# Patient Record
Sex: FEMALE | Race: WHITE | NOT HISPANIC OR LATINO | URBAN - METROPOLITAN AREA
[De-identification: names, ages, dates, MRNs, and addresses within clinical notes are randomized per-mention and may not be internally consistent; named-entity substitution may affect disease eponyms.]

---

## 2024-12-03 ENCOUNTER — APPOINTMENT (OUTPATIENT)
Dept: RADIOLOGY | Facility: HOSPITAL | Age: 69
End: 2024-12-03
Payer: COMMERCIAL

## 2024-12-03 ENCOUNTER — HOSPITAL ENCOUNTER (INPATIENT)
Facility: HOSPITAL | Age: 69
LOS: 3 days | Discharge: HOME | End: 2024-12-06
Attending: STUDENT IN AN ORGANIZED HEALTH CARE EDUCATION/TRAINING PROGRAM | Admitting: INTERNAL MEDICINE
Payer: COMMERCIAL

## 2024-12-03 ENCOUNTER — APPOINTMENT (OUTPATIENT)
Dept: CARDIOLOGY | Facility: HOSPITAL | Age: 69
End: 2024-12-03
Payer: COMMERCIAL

## 2024-12-03 DIAGNOSIS — I50.41 ACUTE COMBINED SYSTOLIC AND DIASTOLIC HEART FAILURE: ICD-10-CM

## 2024-12-03 DIAGNOSIS — I50.23 ACUTE ON CHRONIC SYSTOLIC (CONGESTIVE) HEART FAILURE: ICD-10-CM

## 2024-12-03 DIAGNOSIS — A41.9 SEPSIS (MULTI): Primary | ICD-10-CM

## 2024-12-03 DIAGNOSIS — J81.0 FLASH PULMONARY EDEMA: ICD-10-CM

## 2024-12-03 DIAGNOSIS — J44.1 COPD EXACERBATION (MULTI): ICD-10-CM

## 2024-12-03 DIAGNOSIS — I42.9 CARDIOMYOPATHY, UNSPECIFIED: ICD-10-CM

## 2024-12-03 DIAGNOSIS — H61.22 IMPACTED CERUMEN OF LEFT EAR: ICD-10-CM

## 2024-12-03 DIAGNOSIS — J18.9 PNEUMONIA OF BOTH LUNGS DUE TO INFECTIOUS ORGANISM, UNSPECIFIED PART OF LUNG: ICD-10-CM

## 2024-12-03 DIAGNOSIS — J96.02 ACUTE RESPIRATORY FAILURE WITH HYPERCAPNIA (MULTI): ICD-10-CM

## 2024-12-03 LAB
ALBUMIN SERPL BCP-MCNC: 3.7 G/DL (ref 3.4–5)
ALP SERPL-CCNC: 73 U/L (ref 33–136)
ALT SERPL W P-5'-P-CCNC: 13 U/L (ref 7–45)
ANION GAP BLDV CALCULATED.4IONS-SCNC: 16 MMOL/L (ref 10–25)
ANION GAP BLDV CALCULATED.4IONS-SCNC: 9 MMOL/L (ref 10–25)
ANION GAP SERPL CALC-SCNC: 17 MMOL/L (ref 10–20)
APTT PPP: 31 SECONDS (ref 27–38)
AST SERPL W P-5'-P-CCNC: 20 U/L (ref 9–39)
BASE EXCESS BLDV CALC-SCNC: -2.1 MMOL/L (ref -2–3)
BASE EXCESS BLDV CALC-SCNC: -9.7 MMOL/L (ref -2–3)
BASOPHILS # BLD AUTO: 0.02 X10*3/UL (ref 0–0.1)
BASOPHILS NFR BLD AUTO: 0.2 %
BILIRUB DIRECT SERPL-MCNC: 0 MG/DL (ref 0–0.3)
BILIRUB SERPL-MCNC: 0.3 MG/DL (ref 0–1.2)
BNP SERPL-MCNC: 2654 PG/ML (ref 0–99)
BODY TEMPERATURE: 37 DEGREES CELSIUS
BODY TEMPERATURE: 37 DEGREES CELSIUS
BUN SERPL-MCNC: 15 MG/DL (ref 6–23)
CA-I BLDV-SCNC: 1.1 MMOL/L (ref 1.1–1.33)
CA-I BLDV-SCNC: 1.14 MMOL/L (ref 1.1–1.33)
CALCIUM SERPL-MCNC: 8.4 MG/DL (ref 8.6–10.3)
CARDIAC TROPONIN I PNL SERPL HS: 164 NG/L (ref 0–13)
CARDIAC TROPONIN I PNL SERPL HS: 224 NG/L (ref 0–13)
CARDIAC TROPONIN I PNL SERPL HS: 251 NG/L (ref 0–13)
CHLORIDE BLDV-SCNC: 101 MMOL/L (ref 98–107)
CHLORIDE BLDV-SCNC: 98 MMOL/L (ref 98–107)
CHLORIDE SERPL-SCNC: 98 MMOL/L (ref 98–107)
CHOLEST SERPL-MCNC: 117 MG/DL (ref 0–199)
CHOLESTEROL/HDL RATIO: 2.6
CO2 SERPL-SCNC: 21 MMOL/L (ref 21–32)
CREAT SERPL-MCNC: 0.91 MG/DL (ref 0.5–1.05)
D DIMER PPP FEU-MCNC: 4570 NG/ML FEU
EGFRCR SERPLBLD CKD-EPI 2021: 69 ML/MIN/1.73M*2
EJECTION FRACTION APICAL 4 CHAMBER: 44.8
EJECTION FRACTION: 40 %
EOSINOPHIL # BLD AUTO: 0 X10*3/UL (ref 0–0.7)
EOSINOPHIL NFR BLD AUTO: 0 %
ERYTHROCYTE [DISTWIDTH] IN BLOOD BY AUTOMATED COUNT: 15.8 % (ref 11.5–14.5)
GLUCOSE BLD MANUAL STRIP-MCNC: 119 MG/DL (ref 74–99)
GLUCOSE BLD MANUAL STRIP-MCNC: 133 MG/DL (ref 74–99)
GLUCOSE BLD MANUAL STRIP-MCNC: 156 MG/DL (ref 74–99)
GLUCOSE BLDV-MCNC: 214 MG/DL (ref 74–99)
GLUCOSE BLDV-MCNC: 318 MG/DL (ref 74–99)
GLUCOSE SERPL-MCNC: 325 MG/DL (ref 74–99)
HCO3 BLDV-SCNC: 20.9 MMOL/L (ref 22–26)
HCO3 BLDV-SCNC: 23.2 MMOL/L (ref 22–26)
HCT VFR BLD AUTO: 36.6 % (ref 36–46)
HCT VFR BLD EST: 34 % (ref 36–46)
HCT VFR BLD EST: 36 % (ref 36–46)
HDLC SERPL-MCNC: 44.7 MG/DL
HGB BLD-MCNC: 11.7 G/DL (ref 12–16)
HGB BLDV-MCNC: 11.2 G/DL (ref 12–16)
HGB BLDV-MCNC: 12 G/DL (ref 12–16)
IMM GRANULOCYTES # BLD AUTO: 0.03 X10*3/UL (ref 0–0.7)
IMM GRANULOCYTES NFR BLD AUTO: 0.3 % (ref 0–0.9)
INHALED O2 CONCENTRATION: 40 %
INHALED O2 CONCENTRATION: 50 %
LACTATE BLDV-SCNC: 2.2 MMOL/L (ref 0.4–2)
LACTATE BLDV-SCNC: 3.4 MMOL/L (ref 0.4–2)
LACTATE BLDV-SCNC: 3.7 MMOL/L (ref 0.4–2)
LDLC SERPL CALC-MCNC: 61 MG/DL
LEFT ATRIUM VOLUME AREA LENGTH INDEX BSA: 31.2 ML/M2
LEFT VENTRICLE INTERNAL DIMENSION DIASTOLE: 4.44 CM (ref 3.5–6)
LEFT VENTRICULAR OUTFLOW TRACT DIAMETER: 2 CM
LV EJECTION FRACTION BIPLANE: 40 %
LYMPHOCYTES # BLD AUTO: 0.32 X10*3/UL (ref 1.2–4.8)
LYMPHOCYTES NFR BLD AUTO: 3.3 %
MAGNESIUM SERPL-MCNC: 2.31 MG/DL (ref 1.6–2.4)
MCH RBC QN AUTO: 30.8 PG (ref 26–34)
MCHC RBC AUTO-ENTMCNC: 32 G/DL (ref 32–36)
MCV RBC AUTO: 96 FL (ref 80–100)
MITRAL VALVE E/A RATIO: 0.52
MONOCYTES # BLD AUTO: 0.28 X10*3/UL (ref 0.1–1)
MONOCYTES NFR BLD AUTO: 2.8 %
NEUTROPHILS # BLD AUTO: 9.19 X10*3/UL (ref 1.2–7.7)
NEUTROPHILS NFR BLD AUTO: 93.4 %
NON HDL CHOLESTEROL: 72 MG/DL (ref 0–149)
NRBC BLD-RTO: 0 /100 WBCS (ref 0–0)
OXYHGB MFR BLDV: 23.8 % (ref 45–75)
OXYHGB MFR BLDV: 90.6 % (ref 45–75)
PCO2 BLDV: 41 MM HG (ref 41–51)
PCO2 BLDV: 69 MM HG (ref 41–51)
PH BLDV: 7.09 PH (ref 7.33–7.43)
PH BLDV: 7.36 PH (ref 7.33–7.43)
PLATELET # BLD AUTO: 224 X10*3/UL (ref 150–450)
PO2 BLDV: 28 MM HG (ref 35–45)
PO2 BLDV: 68 MM HG (ref 35–45)
POTASSIUM BLDV-SCNC: 3.6 MMOL/L (ref 3.5–5.3)
POTASSIUM BLDV-SCNC: 4.4 MMOL/L (ref 3.5–5.3)
POTASSIUM SERPL-SCNC: 4 MMOL/L (ref 3.5–5.3)
PROT SERPL-MCNC: 7.6 G/DL (ref 6.4–8.2)
RBC # BLD AUTO: 3.8 X10*6/UL (ref 4–5.2)
RIGHT VENTRICLE FREE WALL PEAK S': 9.79 CM/S
SAO2 % BLDV: 24 % (ref 45–75)
SAO2 % BLDV: 92 % (ref 45–75)
SODIUM BLDV-SCNC: 130 MMOL/L (ref 136–145)
SODIUM BLDV-SCNC: 130 MMOL/L (ref 136–145)
SODIUM SERPL-SCNC: 132 MMOL/L (ref 136–145)
T4 FREE SERPL-MCNC: 0.68 NG/DL (ref 0.61–1.12)
TRICUSPID ANNULAR PLANE SYSTOLIC EXCURSION: 1.5 CM
TRIGL SERPL-MCNC: 57 MG/DL (ref 0–149)
TSH SERPL-ACNC: 18.63 MIU/L (ref 0.44–3.98)
UFH PPP CHRO-ACNC: 0.2 IU/ML
UFH PPP CHRO-ACNC: 0.4 IU/ML
UFH PPP CHRO-ACNC: 0.5 IU/ML
VLDL: 11 MG/DL (ref 0–40)
WBC # BLD AUTO: 9.8 X10*3/UL (ref 4.4–11.3)

## 2024-12-03 PROCEDURE — 2500000004 HC RX 250 GENERAL PHARMACY W/ HCPCS (ALT 636 FOR OP/ED): Performed by: NURSE PRACTITIONER

## 2024-12-03 PROCEDURE — 83718 ASSAY OF LIPOPROTEIN: CPT | Performed by: NURSE PRACTITIONER

## 2024-12-03 PROCEDURE — 99285 EMERGENCY DEPT VISIT HI MDM: CPT | Mod: 25 | Performed by: STUDENT IN AN ORGANIZED HEALTH CARE EDUCATION/TRAINING PROGRAM

## 2024-12-03 PROCEDURE — 2500000004 HC RX 250 GENERAL PHARMACY W/ HCPCS (ALT 636 FOR OP/ED): Performed by: STUDENT IN AN ORGANIZED HEALTH CARE EDUCATION/TRAINING PROGRAM

## 2024-12-03 PROCEDURE — 96365 THER/PROPH/DIAG IV INF INIT: CPT

## 2024-12-03 PROCEDURE — 84484 ASSAY OF TROPONIN QUANT: CPT | Performed by: STUDENT IN AN ORGANIZED HEALTH CARE EDUCATION/TRAINING PROGRAM

## 2024-12-03 PROCEDURE — S4991 NICOTINE PATCH NONLEGEND: HCPCS | Performed by: NURSE PRACTITIONER

## 2024-12-03 PROCEDURE — 85025 COMPLETE CBC W/AUTO DIFF WBC: CPT | Performed by: NURSE PRACTITIONER

## 2024-12-03 PROCEDURE — 84132 ASSAY OF SERUM POTASSIUM: CPT | Performed by: STUDENT IN AN ORGANIZED HEALTH CARE EDUCATION/TRAINING PROGRAM

## 2024-12-03 PROCEDURE — 82947 ASSAY GLUCOSE BLOOD QUANT: CPT | Performed by: STUDENT IN AN ORGANIZED HEALTH CARE EDUCATION/TRAINING PROGRAM

## 2024-12-03 PROCEDURE — 84443 ASSAY THYROID STIM HORMONE: CPT | Performed by: NURSE PRACTITIONER

## 2024-12-03 PROCEDURE — 2500000001 HC RX 250 WO HCPCS SELF ADMINISTERED DRUGS (ALT 637 FOR MEDICARE OP): Performed by: NURSE PRACTITIONER

## 2024-12-03 PROCEDURE — 94799 UNLISTED PULMONARY SVC/PX: CPT

## 2024-12-03 PROCEDURE — 94640 AIRWAY INHALATION TREATMENT: CPT

## 2024-12-03 PROCEDURE — 5A09357 ASSISTANCE WITH RESPIRATORY VENTILATION, LESS THAN 24 CONSECUTIVE HOURS, CONTINUOUS POSITIVE AIRWAY PRESSURE: ICD-10-PCS | Performed by: INTERNAL MEDICINE

## 2024-12-03 PROCEDURE — 96368 THER/DIAG CONCURRENT INF: CPT

## 2024-12-03 PROCEDURE — 2500000004 HC RX 250 GENERAL PHARMACY W/ HCPCS (ALT 636 FOR OP/ED): Performed by: INTERNAL MEDICINE

## 2024-12-03 PROCEDURE — 94660 CPAP INITIATION&MGMT: CPT

## 2024-12-03 PROCEDURE — 85520 HEPARIN ASSAY: CPT | Performed by: EMERGENCY MEDICINE

## 2024-12-03 PROCEDURE — 2500000001 HC RX 250 WO HCPCS SELF ADMINISTERED DRUGS (ALT 637 FOR MEDICARE OP): Performed by: STUDENT IN AN ORGANIZED HEALTH CARE EDUCATION/TRAINING PROGRAM

## 2024-12-03 PROCEDURE — 82947 ASSAY GLUCOSE BLOOD QUANT: CPT

## 2024-12-03 PROCEDURE — 83036 HEMOGLOBIN GLYCOSYLATED A1C: CPT | Mod: PORLAB | Performed by: NURSE PRACTITIONER

## 2024-12-03 PROCEDURE — 85520 HEPARIN ASSAY: CPT | Performed by: INTERNAL MEDICINE

## 2024-12-03 PROCEDURE — 83735 ASSAY OF MAGNESIUM: CPT | Performed by: STUDENT IN AN ORGANIZED HEALTH CARE EDUCATION/TRAINING PROGRAM

## 2024-12-03 PROCEDURE — 2550000001 HC RX 255 CONTRASTS: Performed by: STUDENT IN AN ORGANIZED HEALTH CARE EDUCATION/TRAINING PROGRAM

## 2024-12-03 PROCEDURE — 2060000001 HC INTERMEDIATE ICU ROOM DAILY

## 2024-12-03 PROCEDURE — 84439 ASSAY OF FREE THYROXINE: CPT | Performed by: NURSE PRACTITIONER

## 2024-12-03 PROCEDURE — 93306 TTE W/DOPPLER COMPLETE: CPT | Performed by: INTERNAL MEDICINE

## 2024-12-03 PROCEDURE — 83880 ASSAY OF NATRIURETIC PEPTIDE: CPT | Performed by: STUDENT IN AN ORGANIZED HEALTH CARE EDUCATION/TRAINING PROGRAM

## 2024-12-03 PROCEDURE — 96366 THER/PROPH/DIAG IV INF ADDON: CPT

## 2024-12-03 PROCEDURE — 84075 ASSAY ALKALINE PHOSPHATASE: CPT | Performed by: STUDENT IN AN ORGANIZED HEALTH CARE EDUCATION/TRAINING PROGRAM

## 2024-12-03 PROCEDURE — 87449 NOS EACH ORGANISM AG IA: CPT | Mod: PORLAB | Performed by: NURSE PRACTITIONER

## 2024-12-03 PROCEDURE — 93306 TTE W/DOPPLER COMPLETE: CPT

## 2024-12-03 PROCEDURE — 96374 THER/PROPH/DIAG INJ IV PUSH: CPT | Mod: 59

## 2024-12-03 PROCEDURE — 85379 FIBRIN DEGRADATION QUANT: CPT | Performed by: STUDENT IN AN ORGANIZED HEALTH CARE EDUCATION/TRAINING PROGRAM

## 2024-12-03 PROCEDURE — 71275 CT ANGIOGRAPHY CHEST: CPT | Mod: FOREIGN READ | Performed by: RADIOLOGY

## 2024-12-03 PROCEDURE — 87040 BLOOD CULTURE FOR BACTERIA: CPT | Mod: PORLAB | Performed by: STUDENT IN AN ORGANIZED HEALTH CARE EDUCATION/TRAINING PROGRAM

## 2024-12-03 PROCEDURE — 2500000002 HC RX 250 W HCPCS SELF ADMINISTERED DRUGS (ALT 637 FOR MEDICARE OP, ALT 636 FOR OP/ED): Performed by: NURSE PRACTITIONER

## 2024-12-03 PROCEDURE — 2500000001 HC RX 250 WO HCPCS SELF ADMINISTERED DRUGS (ALT 637 FOR MEDICARE OP): Performed by: INTERNAL MEDICINE

## 2024-12-03 PROCEDURE — 80048 BASIC METABOLIC PNL TOTAL CA: CPT | Performed by: STUDENT IN AN ORGANIZED HEALTH CARE EDUCATION/TRAINING PROGRAM

## 2024-12-03 PROCEDURE — 2500000005 HC RX 250 GENERAL PHARMACY W/O HCPCS: Performed by: REGISTERED NURSE

## 2024-12-03 PROCEDURE — 99223 1ST HOSP IP/OBS HIGH 75: CPT | Performed by: NURSE PRACTITIONER

## 2024-12-03 PROCEDURE — 2500000002 HC RX 250 W HCPCS SELF ADMINISTERED DRUGS (ALT 637 FOR MEDICARE OP, ALT 636 FOR OP/ED): Performed by: STUDENT IN AN ORGANIZED HEALTH CARE EDUCATION/TRAINING PROGRAM

## 2024-12-03 PROCEDURE — 36415 COLL VENOUS BLD VENIPUNCTURE: CPT | Performed by: STUDENT IN AN ORGANIZED HEALTH CARE EDUCATION/TRAINING PROGRAM

## 2024-12-03 PROCEDURE — 71275 CT ANGIOGRAPHY CHEST: CPT

## 2024-12-03 PROCEDURE — 85730 THROMBOPLASTIN TIME PARTIAL: CPT | Performed by: NURSE PRACTITIONER

## 2024-12-03 PROCEDURE — 82374 ASSAY BLOOD CARBON DIOXIDE: CPT | Performed by: STUDENT IN AN ORGANIZED HEALTH CARE EDUCATION/TRAINING PROGRAM

## 2024-12-03 PROCEDURE — 2500000004 HC RX 250 GENERAL PHARMACY W/ HCPCS (ALT 636 FOR OP/ED)

## 2024-12-03 PROCEDURE — 96375 TX/PRO/DX INJ NEW DRUG ADDON: CPT

## 2024-12-03 PROCEDURE — 83605 ASSAY OF LACTIC ACID: CPT | Performed by: STUDENT IN AN ORGANIZED HEALTH CARE EDUCATION/TRAINING PROGRAM

## 2024-12-03 RX ORDER — METOPROLOL TARTRATE 1 MG/ML
INJECTION, SOLUTION INTRAVENOUS
Status: COMPLETED
Start: 2024-12-03 | End: 2024-12-03

## 2024-12-03 RX ORDER — HEPARIN SODIUM 10000 [USP'U]/100ML
0-4500 INJECTION, SOLUTION INTRAVENOUS CONTINUOUS
Status: DISCONTINUED | OUTPATIENT
Start: 2024-12-03 | End: 2024-12-06

## 2024-12-03 RX ORDER — MIDAZOLAM HYDROCHLORIDE 1 MG/ML
1 INJECTION, SOLUTION INTRAMUSCULAR; INTRAVENOUS ONCE
Status: COMPLETED | OUTPATIENT
Start: 2024-12-03 | End: 2024-12-03

## 2024-12-03 RX ORDER — CEFTRIAXONE 1 G/50ML
1 INJECTION, SOLUTION INTRAVENOUS ONCE
Status: COMPLETED | OUTPATIENT
Start: 2024-12-03 | End: 2024-12-03

## 2024-12-03 RX ORDER — TALC
3 POWDER (GRAM) TOPICAL NIGHTLY PRN
Status: DISCONTINUED | OUTPATIENT
Start: 2024-12-03 | End: 2024-12-06 | Stop reason: HOSPADM

## 2024-12-03 RX ORDER — ENOXAPARIN SODIUM 100 MG/ML
40 INJECTION SUBCUTANEOUS EVERY 24 HOURS
Status: DISCONTINUED | OUTPATIENT
Start: 2024-12-03 | End: 2024-12-03 | Stop reason: SDUPTHER

## 2024-12-03 RX ORDER — DEXTROSE 50 % IN WATER (D50W) INTRAVENOUS SYRINGE
12.5
Status: DISCONTINUED | OUTPATIENT
Start: 2024-12-03 | End: 2024-12-06 | Stop reason: HOSPADM

## 2024-12-03 RX ORDER — IBUPROFEN 200 MG
1 TABLET ORAL DAILY
Status: DISCONTINUED | OUTPATIENT
Start: 2024-12-03 | End: 2024-12-06 | Stop reason: HOSPADM

## 2024-12-03 RX ORDER — FUROSEMIDE 10 MG/ML
INJECTION INTRAMUSCULAR; INTRAVENOUS
Status: COMPLETED
Start: 2024-12-03 | End: 2024-12-03

## 2024-12-03 RX ORDER — LORAZEPAM 2 MG/ML
0.5 INJECTION INTRAMUSCULAR ONCE
Status: COMPLETED | OUTPATIENT
Start: 2024-12-03 | End: 2024-12-03

## 2024-12-03 RX ORDER — NITROGLYCERIN 0.4 MG/1
TABLET SUBLINGUAL
Status: COMPLETED
Start: 2024-12-03 | End: 2024-12-03

## 2024-12-03 RX ORDER — CARVEDILOL 3.12 MG/1
3.12 TABLET ORAL 2 TIMES DAILY
Status: DISCONTINUED | OUTPATIENT
Start: 2024-12-03 | End: 2024-12-06 | Stop reason: HOSPADM

## 2024-12-03 RX ORDER — FUROSEMIDE 10 MG/ML
40 INJECTION INTRAMUSCULAR; INTRAVENOUS ONCE
Status: COMPLETED | OUTPATIENT
Start: 2024-12-03 | End: 2024-12-03

## 2024-12-03 RX ORDER — FUROSEMIDE 10 MG/ML
40 INJECTION INTRAMUSCULAR; INTRAVENOUS EVERY 12 HOURS
Status: DISCONTINUED | OUTPATIENT
Start: 2024-12-03 | End: 2024-12-06

## 2024-12-03 RX ORDER — ACETAMINOPHEN 325 MG/1
975 TABLET ORAL ONCE
Status: DISCONTINUED | OUTPATIENT
Start: 2024-12-03 | End: 2024-12-06 | Stop reason: HOSPADM

## 2024-12-03 RX ORDER — ACETAMINOPHEN 325 MG/1
650 TABLET ORAL EVERY 4 HOURS PRN
Status: DISCONTINUED | OUTPATIENT
Start: 2024-12-03 | End: 2024-12-06 | Stop reason: HOSPADM

## 2024-12-03 RX ORDER — METOPROLOL TARTRATE 1 MG/ML
5 INJECTION, SOLUTION INTRAVENOUS ONCE
Status: COMPLETED | OUTPATIENT
Start: 2024-12-03 | End: 2024-12-03

## 2024-12-03 RX ORDER — MIDAZOLAM HYDROCHLORIDE 1 MG/ML
INJECTION, SOLUTION INTRAMUSCULAR; INTRAVENOUS
Status: COMPLETED
Start: 2024-12-03 | End: 2024-12-03

## 2024-12-03 RX ORDER — DEXTROSE 50 % IN WATER (D50W) INTRAVENOUS SYRINGE
25
Status: DISCONTINUED | OUTPATIENT
Start: 2024-12-03 | End: 2024-12-06 | Stop reason: HOSPADM

## 2024-12-03 RX ORDER — IPRATROPIUM BROMIDE AND ALBUTEROL SULFATE 2.5; .5 MG/3ML; MG/3ML
3 SOLUTION RESPIRATORY (INHALATION) EVERY 20 MIN
Status: DISPENSED | OUTPATIENT
Start: 2024-12-03 | End: 2024-12-03

## 2024-12-03 RX ORDER — LORAZEPAM 2 MG/ML
INJECTION INTRAMUSCULAR
Status: COMPLETED
Start: 2024-12-03 | End: 2024-12-03

## 2024-12-03 RX ORDER — IPRATROPIUM BROMIDE AND ALBUTEROL SULFATE 2.5; .5 MG/3ML; MG/3ML
3 SOLUTION RESPIRATORY (INHALATION) 3 TIMES DAILY
Status: DISCONTINUED | OUTPATIENT
Start: 2024-12-03 | End: 2024-12-04

## 2024-12-03 RX ORDER — CEFTRIAXONE 1 G/50ML
1 INJECTION, SOLUTION INTRAVENOUS EVERY 24 HOURS
Status: DISCONTINUED | OUTPATIENT
Start: 2024-12-03 | End: 2024-12-06 | Stop reason: HOSPADM

## 2024-12-03 RX ORDER — IPRATROPIUM BROMIDE AND ALBUTEROL SULFATE 2.5; .5 MG/3ML; MG/3ML
3 SOLUTION RESPIRATORY (INHALATION)
Status: DISCONTINUED | OUTPATIENT
Start: 2024-12-03 | End: 2024-12-03

## 2024-12-03 RX ORDER — NITROGLYCERIN 0.4 MG/1
0.4 TABLET SUBLINGUAL EVERY 5 MIN PRN
Status: DISCONTINUED | OUTPATIENT
Start: 2024-12-03 | End: 2024-12-06 | Stop reason: HOSPADM

## 2024-12-03 RX ORDER — NITROGLYCERIN 20 MG/100ML
5-200 INJECTION INTRAVENOUS CONTINUOUS
Status: DISCONTINUED | OUTPATIENT
Start: 2024-12-03 | End: 2024-12-06 | Stop reason: HOSPADM

## 2024-12-03 RX ORDER — SENNOSIDES 8.6 MG/1
2 TABLET ORAL 2 TIMES DAILY
Status: DISCONTINUED | OUTPATIENT
Start: 2024-12-03 | End: 2024-12-06 | Stop reason: HOSPADM

## 2024-12-03 RX ORDER — INSULIN LISPRO 100 [IU]/ML
0-5 INJECTION, SOLUTION INTRAVENOUS; SUBCUTANEOUS
Status: DISCONTINUED | OUTPATIENT
Start: 2024-12-03 | End: 2024-12-06 | Stop reason: HOSPADM

## 2024-12-03 SDOH — SOCIAL STABILITY: SOCIAL INSECURITY: WITHIN THE LAST YEAR, HAVE YOU BEEN AFRAID OF YOUR PARTNER OR EX-PARTNER?: NO

## 2024-12-03 SDOH — SOCIAL STABILITY: SOCIAL INSECURITY
WITHIN THE LAST YEAR, HAVE YOU BEEN KICKED, HIT, SLAPPED, OR OTHERWISE PHYSICALLY HURT BY YOUR PARTNER OR EX-PARTNER?: NO

## 2024-12-03 SDOH — ECONOMIC STABILITY: FOOD INSECURITY: WITHIN THE PAST 12 MONTHS, YOU WORRIED THAT YOUR FOOD WOULD RUN OUT BEFORE YOU GOT THE MONEY TO BUY MORE.: NEVER TRUE

## 2024-12-03 SDOH — SOCIAL STABILITY: SOCIAL INSECURITY
WITHIN THE LAST YEAR, HAVE YOU BEEN RAPED OR FORCED TO HAVE ANY KIND OF SEXUAL ACTIVITY BY YOUR PARTNER OR EX-PARTNER?: NO

## 2024-12-03 SDOH — SOCIAL STABILITY: SOCIAL INSECURITY: WITHIN THE LAST YEAR, HAVE YOU BEEN HUMILIATED OR EMOTIONALLY ABUSED IN OTHER WAYS BY YOUR PARTNER OR EX-PARTNER?: NO

## 2024-12-03 SDOH — ECONOMIC STABILITY: INCOME INSECURITY: IN THE PAST 12 MONTHS HAS THE ELECTRIC, GAS, OIL, OR WATER COMPANY THREATENED TO SHUT OFF SERVICES IN YOUR HOME?: NO

## 2024-12-03 SDOH — ECONOMIC STABILITY: FOOD INSECURITY: WITHIN THE PAST 12 MONTHS, THE FOOD YOU BOUGHT JUST DIDN'T LAST AND YOU DIDN'T HAVE MONEY TO GET MORE.: NEVER TRUE

## 2024-12-03 SDOH — SOCIAL STABILITY: SOCIAL INSECURITY: ABUSE: ADULT

## 2024-12-03 SDOH — SOCIAL STABILITY: SOCIAL INSECURITY: WERE YOU ABLE TO COMPLETE ALL THE BEHAVIORAL HEALTH SCREENINGS?: NO

## 2024-12-03 SDOH — SOCIAL STABILITY: SOCIAL INSECURITY: HAVE YOU HAD THOUGHTS OF HARMING ANYONE ELSE?: UNABLE TO ASSESS

## 2024-12-03 ASSESSMENT — ACTIVITIES OF DAILY LIVING (ADL)
LACK_OF_TRANSPORTATION: NO
DRESSING YOURSELF: NEEDS ASSISTANCE
HEARING - LEFT EAR: UNABLE TO ASSESS
FEEDING YOURSELF: NEEDS ASSISTANCE
HEARING - LEFT EAR: FUNCTIONAL
PATIENT'S MEMORY ADEQUATE TO SAFELY COMPLETE DAILY ACTIVITIES?: NO
WALKS IN HOME: NEEDS ASSISTANCE
TOILETING: NEEDS ASSISTANCE
ADEQUATE_TO_COMPLETE_ADL: NO
GROOMING: NEEDS ASSISTANCE
WALKS IN HOME: NEEDS ASSISTANCE
BATHING: INDEPENDENT
JUDGMENT_ADEQUATE_SAFELY_COMPLETE_DAILY_ACTIVITIES: NO
TOILETING: NEEDS ASSISTANCE
DRESSING YOURSELF: INDEPENDENT
HEARING - RIGHT EAR: FUNCTIONAL
FEEDING YOURSELF: INDEPENDENT
BATHING: NEEDS ASSISTANCE
GROOMING: INDEPENDENT
ADEQUATE_TO_COMPLETE_ADL: NO
JUDGMENT_ADEQUATE_SAFELY_COMPLETE_DAILY_ACTIVITIES: NO
HEARING - RIGHT EAR: UNABLE TO ASSESS
PATIENT'S MEMORY ADEQUATE TO SAFELY COMPLETE DAILY ACTIVITIES?: NO

## 2024-12-03 ASSESSMENT — PAIN - FUNCTIONAL ASSESSMENT
PAIN_FUNCTIONAL_ASSESSMENT: 0-10

## 2024-12-03 ASSESSMENT — PAIN SCALES - GENERAL
PAINLEVEL_OUTOF10: 0 - NO PAIN
PAINLEVEL_OUTOF10: 6

## 2024-12-03 ASSESSMENT — COLUMBIA-SUICIDE SEVERITY RATING SCALE - C-SSRS
1. IN THE PAST MONTH, HAVE YOU WISHED YOU WERE DEAD OR WISHED YOU COULD GO TO SLEEP AND NOT WAKE UP?: NO
2. HAVE YOU ACTUALLY HAD ANY THOUGHTS OF KILLING YOURSELF?: NO
6. HAVE YOU EVER DONE ANYTHING, STARTED TO DO ANYTHING, OR PREPARED TO DO ANYTHING TO END YOUR LIFE?: NO

## 2024-12-03 ASSESSMENT — LIFESTYLE VARIABLES
HOW OFTEN DO YOU HAVE 6 OR MORE DRINKS ON ONE OCCASION: PATIENT UNABLE TO ANSWER
HOW MANY STANDARD DRINKS CONTAINING ALCOHOL DO YOU HAVE ON A TYPICAL DAY: PATIENT UNABLE TO ANSWER
AUDIT-C TOTAL SCORE: -1
EVER HAD A DRINK FIRST THING IN THE MORNING TO STEADY YOUR NERVES TO GET RID OF A HANGOVER: NO
HOW OFTEN DO YOU HAVE A DRINK CONTAINING ALCOHOL: PATIENT UNABLE TO ANSWER
SKIP TO QUESTIONS 9-10: 0
EVER FELT BAD OR GUILTY ABOUT YOUR DRINKING: NO
HAVE PEOPLE ANNOYED YOU BY CRITICIZING YOUR DRINKING: NO
HAVE YOU EVER FELT YOU SHOULD CUT DOWN ON YOUR DRINKING: NO
AUDIT-C TOTAL SCORE: -1
TOTAL SCORE: 0

## 2024-12-03 ASSESSMENT — PAIN DESCRIPTION - LOCATION: LOCATION: KNEE

## 2024-12-03 ASSESSMENT — PAIN DESCRIPTION - PROGRESSION: CLINICAL_PROGRESSION: NOT CHANGED

## 2024-12-03 ASSESSMENT — COGNITIVE AND FUNCTIONAL STATUS - GENERAL: PATIENT BASELINE BEDBOUND: YES

## 2024-12-03 NOTE — ED PROVIDER NOTES
HPI   Chief Complaint   Patient presents with   • Shortness of Breath     Per EMS pt started having some SOB around 0600 this morning and progressively gotten worse throughout the day.       69-year-old female no known medical issues coming in with shortness of breath.  Patient says she started a shortness breath since earlier today.  Having a cough.  Denies chest pain.  She is an active smoker but is never been formally diagnosed with COPD.  Denies any worsening swelling in her legs.  No prior VTE, surgical immobilization or active cancer.  No fevers or chills.              Patient History   Past Medical History:   Diagnosis Date   • Other conditions influencing health status     Thyroid Mass Cystic   • Personal history of other diseases of the circulatory system     History of hypertension   • Personal history of other diseases of the musculoskeletal system and connective tissue     History of arthritis   • Personal history of other endocrine, nutritional and metabolic disease     History of hyperlipidemia   • Unspecified urinary incontinence     Incontinence     Past Surgical History:   Procedure Laterality Date   •  SECTION, CLASSIC  10/29/2015     Section   • OTHER SURGICAL HISTORY  10/27/2015    Biopsy Thyroid Using Percutaneous Core Needle   • OTHER SURGICAL HISTORY  10/27/2015    Laparoscopic Sling Operation For Stress Incontinence     No family history on file.  Social History     Tobacco Use   • Smoking status: Not on file   • Smokeless tobacco: Not on file   Substance Use Topics   • Alcohol use: Not on file   • Drug use: Not on file       Physical Exam   ED Triage Vitals [24 0258]   Temperature Heart Rate Respirations BP   (!) 38 °C (100.4 °F) (!) 124 (!) 47 (!) 207/120      Pulse Ox Temp Source Heart Rate Source Patient Position   (!) 91 % Temporal Monitor Sitting      BP Location FiO2 (%)     Left arm --       Physical Exam  Vitals and nursing note reviewed.   Constitutional:        General: She is in acute distress.      Appearance: She is well-developed.   HENT:      Head: Normocephalic and atraumatic.   Eyes:      Conjunctiva/sclera: Conjunctivae normal.   Cardiovascular:      Rate and Rhythm: Normal rate and regular rhythm.      Heart sounds: No murmur heard.  Pulmonary:      Effort: Tachypnea, accessory muscle usage and respiratory distress present.      Breath sounds: Examination of the right-upper field reveals rales. Examination of the left-upper field reveals rales. Examination of the right-middle field reveals rales. Examination of the left-middle field reveals rales. Examination of the right-lower field reveals rales. Examination of the left-lower field reveals rales. Rales present.   Abdominal:      Palpations: Abdomen is soft.      Tenderness: There is no abdominal tenderness.   Musculoskeletal:         General: No swelling.      Cervical back: Neck supple.      Right lower leg: No tenderness. Edema present.      Left lower leg: No tenderness. Edema present.   Skin:     General: Skin is warm and dry.      Capillary Refill: Capillary refill takes less than 2 seconds.   Neurological:      Mental Status: She is alert.   Psychiatric:         Mood and Affect: Mood normal.           ED Course & MDM   ED Course as of 12/03/24 0725   Tue Dec 03, 2024   0644 EKG shows sinus rhythm with multiple PACs.  T wave inversions across the anterior lateral leads.  No STEMI.  Normal intervals and axis. [RS]      ED Course User Index  [RS] Sohan Garzon DO         Diagnoses as of 12/03/24 0725   Flash pulmonary edema   Pneumonia of both lungs due to infectious organism, unspecified part of lung   Acute respiratory failure with hypercapnia (Multi)   Sepsis (Multi)                 No data recorded     Tigist Coma Scale Score: 15 (12/03/24 0305 : Alma Rosa Almaguer RN)                           Medical Decision Making  HISTORIAN:  Patient    CHART REVIEW:  No pertinent findings    PT SUMMARY:  68-year-old  female presents ED with shortness of breath.  Upon arrival she is in respiratory distress.    DDX:  ACS, PE, PNA, COPD, CHF, Anema, Pericardial effusion, Asthma      PLAN:  Will obtain CBC, BMP, EKG, troponin, BNP, VBG and CTA chest    DISPO/RE-EVAL:  Upon arrival patient was in respiratory distress.  She was placed on BiPAP.  Her systolic blood pressure was initially in the 220s.  She had rales on her lung exam.  I did suspicion for flash pulmonary edema.  I did perform point-of-care ultrasound on the patient's lungs which showed multiple B-lines consistent with pulmonary edema.  She was empirically given multiple doses of sublingual nitroglycerin.  She is placed on BiPAP.  We then started a nitroglycerin drip.  She required a couple doses of benzodiazepines for anxiety and air hunger.  She also was found to be in A-fib with RVR initially.  She was given 5 mg IV metoprolol for this.  After uptitrating her nitroglycerin her blood pressure decreased.  We were then able to get take the nitroglycerin drip off and she has been stable off it for the last 1 to 2 hours.  She did receive 40 mg IV Lasix as well.  Workup wise patient has elevated troponin in the 160s to 220s, this is likely stress-induced from flash pulmonary edema.  BNP was significantly elevated at 2600.  CTA of the chest shows pulmonary edema and pneumonia.  Patient was given IV Rocephin and doxycycline for this.  She also is a longtime smoker so she was treated presumed for COPD with IV Solu-Medrol and DuoNebs.  Initial VBG showed a respiratory acidosis.  Repeat VBG showed improvement of this.  On reevaluation patient is much more comfortable and respiratory rate 14.  With these multiple findings patient will be admitted to the stepdown unit for further evaluation and monitoring.    I spent 30 minutes of critical care time in the evaluation and management of patient which is separate than any billable procedures.            Procedure  Procedures     Sohan  DO Duran  12/03/24 0725

## 2024-12-03 NOTE — CONSULTS
Inpatient consult to Cardiology  Consult performed by: Adeel Cadena MD  Consult ordered by: ROE Rios-CNP        History Of Present Illness:    Maria D Bourgeois is a 69 y.o. female with no PMHx (she has not seen a provider in 20 yrs and takes no meds)  who presented with shortness of breath since earlier today, along with a cough. She is a smoker but is never been formally diagnosed with COPD. She denied fevers, chest pain, leg edema. Upon arrival she was in respiratory distress and placed on BiPAP. Her systolic blood pressure was initially in the 220s. Point-of-care ultrasound revealed showed multiple B-lines consistent with flash pulmonary edema. She was given multiple doses of sublingual nitroglycerin and placed on a nitroglycerin drip. She required a couple doses of benzodiazepines for anxiety and air hunger. She was found to be in A-fib with RVR and given 5 mg IV metoprolol. She was able to be weaned off the NTG gtt.  Initial VBG showed a respiratory acidosis with improvement on repeat. Remainder of ROS reviewed and negative except as indicated in HPI.         Last Recorded Vitals:  Vitals:    12/03/24 0751 12/03/24 0806 12/03/24 0815 12/03/24 0823   BP: 90/76 109/75 100/64    BP Location: Left arm Left arm     Patient Position: Lying Lying     Pulse: 84 83 (!) 105    Resp: 20 16 17 15   Temp:       TempSrc:       SpO2: 100% 100% 99%    Weight:       Height:           Last Labs:  CBC - 12/3/2024:  8:30 AM  9.8 11.7 224    36.6      CMP - 12/3/2024:  3:20 AM  8.4 7.6 20 --- 0.3   _ 3.7 13 73      PTT - No results in last year.  _   _ _     Troponin I, High Sensitivity   Date/Time Value Ref Range Status   12/03/2024 05:35  (HH) 0 - 13 ng/L Final     Comment:     Previous result verified on 12/3/2024 0356 on specimen/case [id number] called with component [facility] for procedure Troponin I, High Sensitivity with value 164 ng/L.   12/03/2024 03:20  (HH) 0 - 13 ng/L Final     BNP   Date/Time Value  "Ref Range Status   2024 03:20 AM 2,654 (H) 0 - 99 pg/mL Final     LDL Calculated   Date/Time Value Ref Range Status   2024 05:35 AM 61 <=99 mg/dL Final     Comment:                                 Near   Borderline      AGE      Desirable  Optimal    High     High     Very High     0-19 Y     0 - 109     ---    110-129   >/= 130     ----    20-24 Y     0 - 119     ---    120-159   >/= 160     ----      >24 Y     0 -  99   100-129  130-159   160-189     >/=190       VLDL   Date/Time Value Ref Range Status   2024 05:35 AM 11 0 - 40 mg/dL Final      Last I/O:  No intake/output data recorded.    Past Cardiology Tests (Last 3 Years):  EKG:  No results found for this or any previous visit from the past 1095 days.    Echo:  No results found for this or any previous visit from the past 1095 days.    Ejection Fractions:  No results found for: \"EF\"  Cath:  No results found for this or any previous visit from the past 1095 days.    Stress Test:  No results found for this or any previous visit from the past 1095 days.    Cardiac Imaging:  No results found for this or any previous visit from the past 1095 days.      Past Medical History:  She has no past medical history on file.    Past Surgical History:  She has a past surgical history that includes Other surgical history; Other surgical history; and  section, classic.      Social History:  She has no history on file for tobacco use, alcohol use, and drug use.    Family History:  No family history on file.     Allergies:  Patient has no known allergies.    Inpatient Medications:  Scheduled medications   Medication Dose Route Frequency    acetaminophen  975 mg oral Once    cefTRIAXone  1 g intravenous q24h    doxycycline  100 mg intravenous q12h    enoxaparin  40 mg subcutaneous q24h    furosemide  40 mg intravenous q12h    insulin lispro  0-5 Units subcutaneous TID AC    ipratropium-albuteroL  3 mL nebulization q6h    methylPREDNISolone sodium " succinate (PF)  40 mg intravenous q24h    nicotine  1 patch transdermal Daily    perflutren lipid microspheres  0.5-10 mL of dilution intravenous Once in imaging    sennosides  2 tablet oral BID     PRN medications   Medication    acetaminophen    dextrose    dextrose    glucagon    glucagon    nitroglycerin     Continuous Medications   Medication Dose Last Rate    nitroglycerin  5-200 mcg/min Stopped (12/03/24 4500)     Outpatient Medications:  No current outpatient medications    Physical Exam:  Constitutional: Well developed, sedated, calm, no acute distress, cooperative   Eyes: EOMI, clear sclerae   ENMT: mucous membranes moist, no lesions seen   Head/Neck: Neck supple, no apparent injury, head atraumatic   Respiratory/Thorax: CTAB, good chest expansion, respirations even and unlabored, pt on BIPAP   Cardiovascular: Regular rate and rhythm, no murmurs/rubs/gallops, normal S1 and S 2   Gastrointestinal: Abdomen nondistended, soft, nontender, +BS, no bruits   Musculoskeletal: ROM intact, no joint swelling, normal  strength   Extremities: no cyanosis, edema, contusions or clubbing   Neurological: no focal deficit, pt sedated   Psychological: pt sedated   Skin: Warm and dry, no lesions, no rashes         Assessment/Plan   1) Acute systolic and diastolic heart failure  IV diuresis  Echo    2) Afib  Now in NSR  Will start IV Heparin gtt with eventual transition to anticoagulation       Code Status:  Full Code    I spent 30 minutes in the professional and overall care of this patient.        Adeel Cadena MD

## 2024-12-03 NOTE — ED NOTES
Handoff received from Gladys VARGAS. Per previous RN Gladys, troponin already resulted at 9am. Existing troponin order 1hr is a duplicate.     Blanca Jiménez RN  12/03/24 5231

## 2024-12-03 NOTE — PROGRESS NOTES
Maria D Bourgeois is a 69 y.o. female admitted for Sepsis (Multi). Pharmacy reviewed the patient's [id number] medications and allergies for accuracy.    The list below reflects the PTA list prior to pharmacy medication history. A summary a changes to the PTA medication list has been listed below. Please review each medication in order reconciliation for additional clarification and justification.    Source of information:  NURSE   NOTES    Medications added:    Medications modified:    Medications to be removed:    Medications of concern:  NO MEDICATIONS  NO DR. Evelyn Mckeon

## 2024-12-03 NOTE — Clinical Note
Closure device placed in the right radial artery. Site closed by radial compression system. 14 ml of air in TR band

## 2024-12-03 NOTE — H&P
[facility] MEDICINE HISTORY AND PHYSICAL    History Of Present Illness     Maria D Bourgeois is a 69 y.o. female with no PMHx (she has not seen a provider in 20 yrs and takes no meds)  who presented with shortness of breath since earlier today, along with a cough. She is a smoker but is never been formally diagnosed with COPD. She denied fevers, chest pain, leg edema. Upon arrival she was in respiratory distress and placed on BiPAP. Her systolic blood pressure was initially in the 220s. Point-of-care ultrasound revealed showed multiple B-lines consistent with flash pulmonary edema. She was given multiple doses of sublingual nitroglycerin and placed on a nitroglycerin drip. She required a couple doses of benzodiazepines for anxiety and air hunger. She was found to be in A-fib with RVR and given 5 mg IV metoprolol. She was able to be weaned off the NTG gtt.  Initial VBG showed a respiratory acidosis with improvement on repeat. Remainder of ROS reviewed and negative except as indicated in HPI.     ED Course:  Vitals on presentation: T 38C  RR 47 /120 O2 91%/NRB  Remarkable labs: , Na 132, BNP 2654, troponin 164>224, d-dimer 4570, no CBC as yet  EKG: EKG shows sinus rhythm with multiple PACs. T wave inversions across the anterior lateral leads. No STEMI. Normal intervals and axis   Imaging: CTA chest: Developing left lower lobe consolidation with patchy areas of airspace  disease in the right middle lobe and right lower lobe indicating multilobar pneumonia in the appropriate clinical setting. Cardiomegaly with mild pulmonary edema and trace bilateral pleural effusions suggesting CHF. No definite pulmonary embolus.  Interventions: Blood cultures, ceftriaxone, doxycycline, Solumedrol IV Lasix, Duoneb, NTG gtt    Objective     Past Medical History  She has no past medical history on file.    Surgical History  She has a past surgical history that includes Other surgical history; Other surgical history;  and  section, classic.         Family History  No family history on file.    Allergies  Patient has no known allergies.    Vitals:    24 0730   BP: 87/54   Pulse: 76   Resp: (!) 25   Temp: 35.6 °C (96.1 °F)   SpO2: 100%       Vitals:    24 0655   Weight: 58.3 kg (128 lb 8.5 oz)       Scheduled medications  acetaminophen, 975 mg, oral, Once  cefTRIAXone, 1 g, intravenous, q24h  doxycycline, 100 mg, intravenous, q12h  enoxaparin, 40 mg, subcutaneous, q24h  furosemide, 40 mg, intravenous, q12h  insulin lispro, 0-5 Units, subcutaneous, TID AC  ipratropium-albuteroL, 3 mL, nebulization, q6h  methylPREDNISolone sodium succinate (PF), 40 mg, intravenous, q24h  perflutren lipid microspheres, 0.5-10 mL of dilution, intravenous, Once in imaging  sennosides, 2 tablet, oral, BID      Continuous medications  nitroglycerin, 5-200 mcg/min, Last Rate: Stopped (24 0438)      PRN medications  PRN medications: acetaminophen, dextrose, dextrose, glucagon, glucagon, nitroglycerin    Results for orders placed or performed during the hospital encounter of 24 (from the past 24 hours)   Troponin I, High Sensitivity   Result Value Ref Range    Troponin I, High Sensitivity 164 (HH) 0 - 13 ng/L   B-Type Natriuretic Peptide   Result Value Ref Range    BNP 2,654 (H) 0 - 99 pg/mL   D-Dimer, VTE Exclusion   Result Value Ref Range    D-Dimer, Quantitative VTE Exclusion 4,570 (H) <=500 ng/mL FEU   Hepatic function panel   Result Value Ref Range    Albumin 3.7 3.4 - 5.0 g/dL    Bilirubin, Total 0.3 0.0 - 1.2 mg/dL    Bilirubin, Direct 0.0 0.0 - 0.3 mg/dL    Alkaline Phosphatase 73 33 - 136 U/L    ALT 13 7 - 45 U/L    AST 20 9 - 39 U/L    Total Protein 7.6 6.4 - 8.2 g/dL   Magnesium   Result Value Ref Range    Magnesium 2.31 1.60 - 2.40 mg/dL   Basic metabolic panel   Result Value Ref Range    Glucose 325 (H) 74 - 99 mg/dL    Sodium 132 (L) 136 - 145 mmol/L    Potassium 4.0 3.5 - 5.3 mmol/L    Chloride 98 98 - 107  mmol/L    Bicarbonate 21 21 - 32 mmol/L    Anion Gap 17 10 - 20 mmol/L    Urea Nitrogen 15 6 - 23 mg/dL    Creatinine 0.91 0.50 - 1.05 mg/dL    eGFR 69 >60 mL/min/1.73m*2    Calcium 8.4 (L) 8.6 - 10.3 mg/dL   Blood Gas Venous Full Panel   Result Value Ref Range    POCT pH, Venous 7.09 (LL) 7.33 - 7.43 pH    POCT pCO2, Venous 69 (H) 41 - 51 mm Hg    POCT pO2, Venous 28 (L) 35 - 45 mm Hg    POCT SO2, Venous 24 (L) 45 - 75 %    POCT Oxy Hemoglobin, Venous 23.8 (L) 45.0 - 75.0 %    POCT Hematocrit Calculated, Venous 36.0 36.0 - 46.0 %    POCT Sodium, Venous 130 (L) 136 - 145 mmol/L    POCT Potassium, Venous 4.4 3.5 - 5.3 mmol/L    POCT Chloride, Venous 98 98 - 107 mmol/L    POCT Ionized Calicum, Venous 1.14 1.10 - 1.33 mmol/L    POCT Glucose, Venous 318 (H) 74 - 99 mg/dL    POCT Lactate, Venous 3.7 (H) 0.4 - 2.0 mmol/L    POCT Base Excess, Venous -9.7 (L) -2.0 - 3.0 mmol/L    POCT HCO3 Calculated, Venous 20.9 (L) 22.0 - 26.0 mmol/L    POCT Hemoglobin, Venous 12.0 12.0 - 16.0 g/dL    POCT Anion Gap, Venous 16.0 10.0 - 25.0 mmol/L    Patient Temperature 37.0 degrees Celsius    FiO2 40 %   Troponin I, High Sensitivity   Result Value Ref Range    Troponin I, High Sensitivity 224 (HH) 0 - 13 ng/L   Blood Gas Venous Full Panel   Result Value Ref Range    POCT pH, Venous 7.36 7.33 - 7.43 pH    POCT pCO2, Venous 41 41 - 51 mm Hg    POCT pO2, Venous 68 (H) 35 - 45 mm Hg    POCT SO2, Venous 92 (H) 45 - 75 %    POCT Oxy Hemoglobin, Venous 90.6 (H) 45.0 - 75.0 %    POCT Hematocrit Calculated, Venous 34.0 (L) 36.0 - 46.0 %    POCT Sodium, Venous 130 (L) 136 - 145 mmol/L    POCT Potassium, Venous 3.6 3.5 - 5.3 mmol/L    POCT Chloride, Venous 101 98 - 107 mmol/L    POCT Ionized Calicum, Venous 1.10 1.10 - 1.33 mmol/L    POCT Glucose, Venous 214 (H) 74 - 99 mg/dL    POCT Lactate, Venous 2.2 (H) 0.4 - 2.0 mmol/L    POCT Base Excess, Venous -2.1 (L) -2.0 - 3.0 mmol/L    POCT HCO3 Calculated, Venous 23.2 22.0 - 26.0 mmol/L    POCT  Hemoglobin, Venous 11.2 (L) 12.0 - 16.0 g/dL    POCT Anion Gap, Venous 9.0 (L) 10.0 - 25.0 mmol/L    Patient Temperature 37.0 degrees Celsius    FiO2 50 %       I personally reviewed all pertinent labwork, imaging and vital signs, as well as medications, nursing, therapy, discharge planning and consult notes.     Constitutional: Well developed, sedated, calm, no acute distress, cooperative   Eyes: EOMI, clear sclerae   ENMT: mucous membranes moist, no lesions seen   Head/Neck: Neck supple, no apparent injury, head atraumatic   Respiratory/Thorax: CTAB, good chest expansion, respirations even and unlabored, pt on BIPAP   Cardiovascular: Regular rate and rhythm, no murmurs/rubs/gallops, normal S1 and S 2   Gastrointestinal: Abdomen nondistended, soft, nontender, +BS, no bruits   Musculoskeletal: ROM intact, no joint swelling, normal  strength   Extremities: no cyanosis, edema, contusions or clubbing   Neurological: no focal deficit, pt sedated   Psychological: pt sedated   Skin: Warm and dry, no lesions, no rashes       Assessment/Plan     Sepsis due to bilateral CAP  Continue empiric ceftriaxone and doxycycline as well as Solumedrol and Duoneb  Blood cultures pending, will check urine antigens  Wean off BIPAP as able    Flash pulmonary edema  Wean off BIPAP as able  Continue IV Lasix, obtain echo and consult cardiology    Acute hypoxic respiratory failure  Due to pulmonary edema, wean BIPAP off as tolerated to maintain O2 sat >91%, pt may need home O2 eval     Hypertensive emergency  Systolic blood pressure was initially in the 220s, this has resolved with NTG gtt and pt has been weaned  Monitor blood pressure closely    NSTEMI  Likely demand ischemia given above, will trend troponins and cardiology is consulted  Defer ischemic workup to them    Tobacco dependence   Pt has never been formally diagnosed with COPD  She has smoked 1 ppd since the 6th grade, will start nicotine patch    Hyperglycemia  No gx diabetes,  check A1c and start empiric SSI protocol    A-fib with RVR   New onset, pt converted with 5 mg IV metoprolol  Suspect this is reactive in setting of sepsis, monitor on telemetry  Defer need for anticoagulation to cardiology     Pt has not seen a provider in 20 yrs and takes no meds   Check TSH and lipid profile    DVT ppx: Lovenox    Discharge disposition  Home when stable        Jessica Pimentel CNP  [facility] Medicine

## 2024-12-03 NOTE — Clinical Note
Closure device placed in the right brachial vein. Site closed by radial compression system. 5 Fr Glidesheath changed out for 16g PIV

## 2024-12-04 PROBLEM — I50.41 ACUTE COMBINED SYSTOLIC AND DIASTOLIC HEART FAILURE: Status: ACTIVE | Noted: 2024-12-03

## 2024-12-04 LAB
ANION GAP SERPL CALC-SCNC: 12 MMOL/L (ref 10–20)
BASOPHILS # BLD AUTO: 0.02 X10*3/UL (ref 0–0.1)
BASOPHILS NFR BLD AUTO: 0.2 %
BUN SERPL-MCNC: 28 MG/DL (ref 6–23)
CALCIUM SERPL-MCNC: 8.2 MG/DL (ref 8.6–10.3)
CHLORIDE SERPL-SCNC: 102 MMOL/L (ref 98–107)
CO2 SERPL-SCNC: 25 MMOL/L (ref 21–32)
CREAT SERPL-MCNC: 0.85 MG/DL (ref 0.5–1.05)
EGFRCR SERPLBLD CKD-EPI 2021: 75 ML/MIN/1.73M*2
EOSINOPHIL # BLD AUTO: 0.04 X10*3/UL (ref 0–0.7)
EOSINOPHIL NFR BLD AUTO: 0.3 %
ERYTHROCYTE [DISTWIDTH] IN BLOOD BY AUTOMATED COUNT: 15.7 % (ref 11.5–14.5)
EST. AVERAGE GLUCOSE BLD GHB EST-MCNC: 134 MG/DL
GLUCOSE BLD MANUAL STRIP-MCNC: 105 MG/DL (ref 74–99)
GLUCOSE BLD MANUAL STRIP-MCNC: 122 MG/DL (ref 74–99)
GLUCOSE BLD MANUAL STRIP-MCNC: 160 MG/DL (ref 74–99)
GLUCOSE BLD MANUAL STRIP-MCNC: 178 MG/DL (ref 74–99)
GLUCOSE SERPL-MCNC: 129 MG/DL (ref 74–99)
HBA1C MFR BLD: 6.3 %
HCT VFR BLD AUTO: 33.6 % (ref 36–46)
HGB BLD-MCNC: 11.1 G/DL (ref 12–16)
IMM GRANULOCYTES # BLD AUTO: 0.05 X10*3/UL (ref 0–0.7)
IMM GRANULOCYTES NFR BLD AUTO: 0.4 % (ref 0–0.9)
LEGIONELLA AG UR QL: NEGATIVE
LYMPHOCYTES # BLD AUTO: 0.84 X10*3/UL (ref 1.2–4.8)
LYMPHOCYTES NFR BLD AUTO: 6.7 %
MCH RBC QN AUTO: 31.1 PG (ref 26–34)
MCHC RBC AUTO-ENTMCNC: 33 G/DL (ref 32–36)
MCV RBC AUTO: 94 FL (ref 80–100)
MONOCYTES # BLD AUTO: 0.51 X10*3/UL (ref 0.1–1)
MONOCYTES NFR BLD AUTO: 4.1 %
NEUTROPHILS # BLD AUTO: 11.11 X10*3/UL (ref 1.2–7.7)
NEUTROPHILS NFR BLD AUTO: 88.3 %
NRBC BLD-RTO: 0 /100 WBCS (ref 0–0)
PLATELET # BLD AUTO: 288 X10*3/UL (ref 150–450)
POTASSIUM SERPL-SCNC: 3.3 MMOL/L (ref 3.5–5.3)
RBC # BLD AUTO: 3.57 X10*6/UL (ref 4–5.2)
SODIUM SERPL-SCNC: 136 MMOL/L (ref 136–145)
UFH PPP CHRO-ACNC: 0.5 IU/ML
WBC # BLD AUTO: 12.6 X10*3/UL (ref 4.4–11.3)

## 2024-12-04 PROCEDURE — 2500000002 HC RX 250 W HCPCS SELF ADMINISTERED DRUGS (ALT 637 FOR MEDICARE OP, ALT 636 FOR OP/ED): Performed by: NURSE PRACTITIONER

## 2024-12-04 PROCEDURE — 94640 AIRWAY INHALATION TREATMENT: CPT

## 2024-12-04 PROCEDURE — 2500000004 HC RX 250 GENERAL PHARMACY W/ HCPCS (ALT 636 FOR OP/ED): Performed by: NURSE PRACTITIONER

## 2024-12-04 PROCEDURE — 94660 CPAP INITIATION&MGMT: CPT

## 2024-12-04 PROCEDURE — 99233 SBSQ HOSP IP/OBS HIGH 50: CPT | Performed by: INTERNAL MEDICINE

## 2024-12-04 PROCEDURE — 80048 BASIC METABOLIC PNL TOTAL CA: CPT | Performed by: NURSE PRACTITIONER

## 2024-12-04 PROCEDURE — 2500000001 HC RX 250 WO HCPCS SELF ADMINISTERED DRUGS (ALT 637 FOR MEDICARE OP): Performed by: CLINICAL NURSE SPECIALIST

## 2024-12-04 PROCEDURE — S4991 NICOTINE PATCH NONLEGEND: HCPCS | Performed by: NURSE PRACTITIONER

## 2024-12-04 PROCEDURE — 2500000001 HC RX 250 WO HCPCS SELF ADMINISTERED DRUGS (ALT 637 FOR MEDICARE OP): Performed by: INTERNAL MEDICINE

## 2024-12-04 PROCEDURE — 99232 SBSQ HOSP IP/OBS MODERATE 35: CPT | Performed by: CLINICAL NURSE SPECIALIST

## 2024-12-04 PROCEDURE — 85520 HEPARIN ASSAY: CPT | Performed by: INTERNAL MEDICINE

## 2024-12-04 PROCEDURE — 2500000004 HC RX 250 GENERAL PHARMACY W/ HCPCS (ALT 636 FOR OP/ED): Performed by: INTERNAL MEDICINE

## 2024-12-04 PROCEDURE — 2060000001 HC INTERMEDIATE ICU ROOM DAILY

## 2024-12-04 PROCEDURE — 85025 COMPLETE CBC W/AUTO DIFF WBC: CPT | Performed by: NURSE PRACTITIONER

## 2024-12-04 PROCEDURE — 82947 ASSAY GLUCOSE BLOOD QUANT: CPT

## 2024-12-04 PROCEDURE — 2500000002 HC RX 250 W HCPCS SELF ADMINISTERED DRUGS (ALT 637 FOR MEDICARE OP, ALT 636 FOR OP/ED): Performed by: CLINICAL NURSE SPECIALIST

## 2024-12-04 PROCEDURE — 2500000002 HC RX 250 W HCPCS SELF ADMINISTERED DRUGS (ALT 637 FOR MEDICARE OP, ALT 636 FOR OP/ED): Performed by: INTERNAL MEDICINE

## 2024-12-04 PROCEDURE — 36415 COLL VENOUS BLD VENIPUNCTURE: CPT | Performed by: NURSE PRACTITIONER

## 2024-12-04 PROCEDURE — 87899 AGENT NOS ASSAY W/OPTIC: CPT | Mod: PORLAB | Performed by: INTERNAL MEDICINE

## 2024-12-04 PROCEDURE — 2500000001 HC RX 250 WO HCPCS SELF ADMINISTERED DRUGS (ALT 637 FOR MEDICARE OP): Performed by: NURSE PRACTITIONER

## 2024-12-04 RX ORDER — POTASSIUM CHLORIDE 20 MEQ/1
40 TABLET, EXTENDED RELEASE ORAL 2 TIMES DAILY
Status: DISCONTINUED | OUTPATIENT
Start: 2024-12-04 | End: 2024-12-06 | Stop reason: HOSPADM

## 2024-12-04 RX ORDER — SPIRONOLACTONE 25 MG/1
25 TABLET ORAL DAILY
Status: DISCONTINUED | OUTPATIENT
Start: 2024-12-04 | End: 2024-12-05

## 2024-12-04 RX ORDER — IPRATROPIUM BROMIDE AND ALBUTEROL SULFATE 2.5; .5 MG/3ML; MG/3ML
3 SOLUTION RESPIRATORY (INHALATION) 3 TIMES DAILY
Status: DISCONTINUED | OUTPATIENT
Start: 2024-12-04 | End: 2024-12-06 | Stop reason: HOSPADM

## 2024-12-04 ASSESSMENT — PAIN SCALES - GENERAL
PAINLEVEL_OUTOF10: 0 - NO PAIN

## 2024-12-04 ASSESSMENT — PAIN - FUNCTIONAL ASSESSMENT
PAIN_FUNCTIONAL_ASSESSMENT: 0-10

## 2024-12-04 NOTE — PROGRESS NOTES
Maria D Bourgeois is a 69 y.o. female on day 1 of admission presenting with Sepsis (Multi).      Subjective   Maria D Bourgeois is a 69 y.o. female with no PMHx (she has not seen a provider in 20 yrs and takes no meds)  who presented with shortness of breath since earlier today, along with a cough. She is a smoker but is never been formally diagnosed with COPD. She denied fevers, chest pain, leg edema. Upon arrival she was in respiratory distress and placed on BiPAP. Her systolic blood pressure was initially in the 220s. Point-of-care ultrasound revealed showed multiple B-lines consistent with flash pulmonary edema. She was given multiple doses of sublingual nitroglycerin and placed on a nitroglycerin drip. She required a couple doses of benzodiazepines for anxiety and air hunger. She was found to be in A-fib with RVR and given 5 mg IV metoprolol. She was able to be weaned off the NTG gtt.  Initial VBG showed a respiratory acidosis with improvement on repeat. Remainder of ROS reviewed and negative except as indicated in HPI.      ED Course:  Vitals on presentation: T 38C  RR 47 /120 O2 91%/NRB  Remarkable labs: , Na 132, BNP 2654, troponin 164>224, d-dimer 4570, no CBC as yet  EKG: EKG shows sinus rhythm with multiple PACs. T wave inversions across the anterior lateral leads. No STEMI. Normal intervals and axis   Imaging: CTA chest: Developing left lower lobe consolidation with patchy areas of airspace  disease in the right middle lobe and right lower lobe indicating multilobar pneumonia in the appropriate clinical setting. Cardiomegaly with mild pulmonary edema and trace bilateral pleural effusions suggesting CHF. No definite pulmonary embolus.  Interventions: Blood cultures, ceftriaxone, doxycycline, Solumedrol IV Lasix, Duoneb, NTG gtt  12/4: Patient was seen and examined.  Still requiring 4 L nasal cannula oxygen to maintain saturation.  Echocardiogram shows ejection fraction of 40% with hypokinesia.   Cardiologist on board and plans cardiac catheterization within the next 24 hours.  A-fib is rate controlled but with intermittent RVR.  Continue IV heparin drip anticoagulation.  Blood cultures are negative x 1 urine Legionella is negative and urine strep antigen is still pending.  Continue current IV antibiotics.  Hypokalemia noted and potassium supplementation given.  WBC trended up.  Will consider adding steroids to breathing treatments in view of patient's extensive smoking history and potential COPD undiagnosed.  Continue to trend CBC and BMP daily  Objective     Last Recorded Vitals  /65 (BP Location: Right arm, Patient Position: Lying)   Pulse (!) 117   Temp 36.2 °C (97.2 °F) (Temporal)   Resp 17   Wt 56.6 kg (124 lb 12.5 oz)   SpO2 94%   Intake/Output last 3 Shifts:    Intake/Output Summary (Last 24 hours) at 12/4/2024 1543  Last data filed at 12/4/2024 1313  Gross per 24 hour   Intake 7173.83 ml   Output 800 ml   Net 6373.83 ml       Admission Weight  Weight: 58.3 kg (128 lb 8.5 oz) (12/03/24 0655)    Daily Weight  12/04/24 : 56.6 kg (124 lb 12.5 oz)    Image Results  Transthoracic Echo (TTE) [place] [address]       Phone [phone] Fax [phone]    TRANSTHORACIC ECHOCARDIOGRAM REPORT    Patient Name:       SUHAS ARAGON          [facility] Physician:    [id number] Adeel Cadena MD  Study Date:         12/3/2024            Ordering Provider:    [id number] EILEEN GUPAT  MRN/PID:            [id number]             Fellow:  Accession#:         [id number]         Nurse:  Date of Birth/Age:  1955 / 69 years Sonographer:          Anali Han RDCS  Gender Assigned at  F                    Additional Staff:  Birth:  Height:             152.40 cm            Admit Date:           12/2/2024  Weight:              58.06 kg             Admission Status:     Inpatient -                                                                 Routine  BSA / BMI:          1.54 m2 / 25.00      Department Location:  [facility]                      kg/m2  Blood Pressure: 104 /59 mmHg    Study Type:    TRANSTHORACIC ECHO (TTE) COMPLETE  Diagnosis/ICD: Acute pulmonary edema-J81.0; Acute respiratory failure with                 hypercapnia-J96.02  Indication:    Acute respiratory failure, Flash pulmonary edema  CPT Codes:     Echo Complete w Full Doppler-02315    Patient History:  Pertinent History: Dyspnea and Pulmonary edema.    Study Detail: The following Echo studies were performed: 2D, M-Mode, Doppler and                color flow.       PHYSICIAN INTERPRETATION:  Left Ventricle: The left ventricular systolic function is moderately decreased, with a Guerrero's biplane calculated ejection fraction of 40%. There is severe concentric left ventricular hypertrophy. There is global hypokinesis of the left ventricle with minor regional variations. The left ventricular cavity size is normal. There is moderately increased septal and severely increased posterior left ventricular wall thickness. Left ventricular diastolic filling was indeterminate.  Left Atrium: The left atrium is normal in size. Increased thickness of the atrial septum (sparing the fossa ovalis) is present. This is consistent with lipomatous hypertrophy of the atrial septum.  Right Ventricle: The right ventricle is normal in size. There is moderately reduced right ventricular systolic function.  Right Atrium: The right atrium is normal in size.  Aortic Valve: The aortic valve is trileaflet. There is no evidence of aortic valve stenosis.  There is mild aortic valve regurgitation.  Mitral Valve: The mitral valve is mildly thickened. There is mild mitral annular calcification. There is trace to mild mitral valve regurgitation.  Tricuspid Valve: The tricuspid valve is structurally  normal. No evidence of tricuspid regurgitation.  Pulmonic Valve: The pulmonic valve is structurally normal. There is no indication of pulmonic valve regurgitation.  Pericardium: No pericardial effusion noted.  Aorta: The aortic root is abnormal. The aortic root is at the upper limits of normal size.  Systemic Veins: The inferior vena cava appears normal in size.       CONCLUSIONS:   1. The left ventricular systolic function is moderately decreased, with a Guerrero's biplane calculated ejection fraction of 40%.   2. There is global hypokinesis of the left ventricle with minor regional variations.   3. Left ventricular diastolic filling was indeterminate.   4. There is moderately reduced right ventricular systolic function.   5. Aortic valve stenosis is not present.   6. Mild aortic valve regurgitation.   7. Lipomatous hypertrophy of the atrial septum.   8. There is moderately increased septal and severely increased posterior left ventricular wall thickness.    QUANTITATIVE DATA SUMMARY:     2D MEASUREMENTS:            Normal Ranges:  Ao Root d:       3.80 cm    (2.0-3.7cm)  IVSd:            1.40 cm    (0.6-1.1cm)  LVPWd:           1.59 cm    (0.6-1.1cm)  LVIDd:           4.44 cm    (3.9-5.9cm)  LVIDs:           3.92 cm  LV Mass Index:   174.2 g/m2  LV % FS          11.7 %       LA VOLUME:                    Normal Ranges:  LA Vol A4C:        38.9 ml    (22+/-6mL/m2)  LA Vol A2C:        51.2 ml  LA Vol BP:         48.2 ml  LA Vol Index A4C:  25.2ml/m2  LA Vol Index A2C:  33.1 ml/m2  LA Vol Index BP:   31.2 ml/m2  LA Area A4C:       16.0 cm2  LA Area A2C:       17.0 cm2  LA Major Axis A4C: 5.6 cm  LA Major Axis A2C: 4.8 cm  LA Volume Index:   30.0 ml/m2       RA VOLUME BY A/L METHOD:         Normal Ranges:  RA Area A4C:             9.5 cm2       M-MODE MEASUREMENTS:         Normal Ranges:  Ao Root:             3.40 cm (2.0-3.7cm)  AoV Exc:             2.00 cm (1.5-2.5cm)  LAs:                 3.20 cm (2.7-4.0cm)        AORTA MEASUREMENTS:         Normal Ranges:  AoV Exc:            2.00 cm (1.5-2.5cm)  Ao Sinus, d:        3.80 cm (2.1-3.5cm)  Asc Ao, d:          3.30 cm (2.1-3.4cm)       LV SYSTOLIC FUNCTION BY 2D PLANIMETRY (MOD):                       Normal Ranges:  EF-A4C View:    45 % (>=55%)  EF-A2C View:    34 %  EF-Biplane:     40 %  LV EF Reported: 40 %       LV DIASTOLIC FUNCTION:            Normal Ranges:  MV Peak E:             0.46 m/s   (0.7-1.2 m/s)  MV Peak A:             0.88 m/s   (0.42-0.7 m/s)  E/A Ratio:             0.52       (1.0-2.2)  MV e'                  0.032 m/s  (>8.0)  MV lateral e'          0.03 m/s  MV medial e'           0.03 m/s  MV A Dur:              87.00 msec  E/e' Ratio:            14.52      (<8.0)       AORTIC VALVE:           Normal Ranges:  LVOT Max Facundo:  1.01 m/s (<=1.1m/s)  LVOT VTI:      13.80 cm  LVOT Diameter: 2.00 cm  (1.8-2.4cm)       AORTIC INSUFFICIENCY:  AI Vmax:       4.16 m/s  AI Half-time:  1039 msec  AI Decel Rate: 141.00 cm/s2       RIGHT VENTRICLE:  RV Basal 3.31 cm  RV Mid   1.79 cm  RV Major 7.6 cm  TAPSE:   14.6 mm  RV s'    0.10 m/s       TRICUSPID VALVE/RVSP:          Normal Ranges:  TV E Vmax:            0.20 m/s (0.3-0.7m/s)  IVC Diam:             1.64 cm       PULMONIC VALVE:          Normal Ranges:  PV Max Facundo:     1.1 m/s  (0.6-0.9m/s)  PV Max P.6 mmHg       [id number] Adeel Cadena MD  Electronically signed on 12/3/2024 at 5:57:30 PM       ** Final **  CT angio chest for pulmonary embolism  Narrative: STUDY:  CT Angiogram of the Chest; 2024 5:26 AM  INDICATION:  Shortness of breath.  Elevated D-dimer.  COMPARISON:  None Available.  ACCESSION NUMBER(S):  [id number]  ORDERING CLINICIAN:  PAO MORALES  TECHNIQUE:  CTA of the chest was performed with intravenous contrast.   Images are reviewed and processed at a workstation according to the CT  angiogram protocol with 3-D and/or MIP post processing imaging  generated.  Automated mA/kV exposure control  was utilized and patient examination  was performed in strict accordance with principles of ALARA.  FINDINGS:  Pulmonary arteries are adequately opacified without acute or chronic  filling defects.  Respiratory motion slightly limits evaluation of the  smaller pulmonary arteries.  The thoracic aorta is normal in course  and caliber without dissection or aneurysm.  The heart is mild to moderate calcified atheromatous plaque is seen in  the thoracic aorta.  Mildly enlarged in size without pericardial  effusion.  Thoracic lymph nodes are not enlarged.  Trachea and mainstem bronchi are patent and clear of debris.  There is  developing consolidation in the left lower lobe with patchy areas of  groundglass airspace disease in the right middle lobe and right lower  lobe.  There is mild pulmonary edema.  Atelectasis is seen in both  lungs.  Mild biapical centrilobular emphysema is noted.  Trace  dependently layering bilateral pleural effusions are present.  No  interstitial lung disease or definite suspicious pulmonary nodules are  identified.  Upper abdomen demonstrates no acute pathology.  There are no acute fractures.  No suspicious bony lesions.   Generalized osseous demineralization is noted with mild to moderate  multilevel disc space narrowing throughout the mid and lower thoracic  spine.  There is a slightly pronounced thoracic kyphosis.  Mild  chronic superior endplate compression of T11 is noted with loss of 25%  of anterior vertebral body height.  There is a mild levoconvex  curvature of the lower thoracic and lumbar spine centered in the  T12-L1 level.  Impression: Developing left lower lobe consolidation with patchy areas of airspace  disease in the right middle lobe and right lower lobe indicating  multilobar pneumonia in the appropriate clinical setting.  Cardiomegaly with mild pulmonary edema and trace bilateral pleural  effusions suggesting CHF.  No definite large central pulmonary embolus  identified.  Signed by Chacorta Cadena      Physical Exam  Constitutional: Well developed, sedated, calm, no acute distress, cooperative   Eyes: EOMI, clear sclerae   ENMT: mucous membranes moist, no lesions seen   Head/Neck: Neck supple, no apparent injury, head atraumatic   Respiratory/Thorax: CTAB, good chest expansion, respirations even and unlabored, pt on BIPAP   Cardiovascular: Regular rate and rhythm, no murmurs/rubs/gallops, normal S1 and S 2   Gastrointestinal: Abdomen nondistended, soft, nontender, +BS, no bruits   Musculoskeletal: ROM intact, no joint swelling, normal  strength   Extremities: no cyanosis, edema, contusions or clubbing   Neurological: no focal deficit, pt sedated   Psychological: pt sedated   Skin: Warm and dry, no lesions, no rashes     Relevant Results               Assessment/Plan   This patient currently has cardiac telemetry ordered; if you would like to modify or discontinue the telemetry order, click here to go to the orders activity to modify/discontinue the order.              Assessment & Plan  Sepsis (Multi)    Acute combined systolic and diastolic heart failure    .  Sepsis due to bilateral CAP  Continue empiric ceftriaxone and doxycycline as well as Solumedrol and Duoneb  Blood cultures negative x 1 day,   Urine Legionella negative; strep pending  Continue as needed DuoNebs  Continue Solu-Medrol  Trend CBC daily     Acute systolic congestive heart failure  Continue IV Lasix twice daily  Strict input output chart  Daily weight  Echocardiogram done shows ejection fraction of 40% with global hypokinesia  Started on GDMT with carvedilol, Entresto, spironolactone, SGLT2 to be started possibly after cath    Acute hypoxic respiratory failure  Likely multifactorial from CHF, pneumonia and likely undiagnosed COPD  Continue nasal cannula oxygen  Due to pulmonary edema, wean BIPAP off as tolerated to maintain O2 sat >91%, pt may need home O2 eval      Hypertensive emergency  Resolved      NSTEMI  Likely demand ischemia given above, will trend troponins   Echocardiogram shows EF of 40% with global hypokinesia  On IV heparin drip for A-fib  Cardiologist plans left heart catheterization in a.m.       Tobacco dependence   Pt has never been formally diagnosed with COPD  She has smoked 1 ppd since the 6th grade, will start nicotine patch     Hyperglycemia  No gx diabetes, check A1c and start empiric SSI protocol     A-fib with RVR   Rate controlled on p.o. carvedilol  Continue IV heparin.  Echocardiogram shows ejection fraction of 40% with global hypokinesia  Cardiologist on board and recommendations appreciated       Pt has not seen a provider in 20 yrs and takes no meds   Check TSH and lipid profile     DVT ppx: Lovenox     Discharge disposition  Home when stable        Spent 35 minutes in the follow-up management of this patient today       Mulugeta De Leon MD

## 2024-12-04 NOTE — CARE PLAN
The clinical goals for the shift include SPO2 will maintain greater than 88%       Problem: Fall/Injury  Goal: Not fall by end of shift  Outcome: Progressing  Goal: Be free from injury by end of the shift  Outcome: Progressing  Goal: Verbalize understanding of personal risk factors for fall in the hospital  Outcome: Progressing  Goal: Verbalize understanding of risk factor reduction measures to prevent injury from fall in the home  Outcome: Progressing  Goal: Use assistive devices by end of the shift  Outcome: Progressing  Goal: Pace activities to prevent fatigue by end of the shift  Outcome: Progressing

## 2024-12-04 NOTE — PROGRESS NOTES
Social work consult placed for positive medical risk screen. SW reviewed pt's chart and communicated with TCC. No SW needs foreseen at this time. SW signing off; available upon request.    JANICE Soliz, LSW ([id number])   Care Transitions

## 2024-12-04 NOTE — PROGRESS NOTES
Subjective Data:  Today, Ms. Bourgeois is sitting up in bed, reports that she is feeling significantly improved, breathing easier. Denies chest pain or pressure, no dizziness or lightheadedness. TTE findings with new LV dysfunction, EF 40%. Discussed TTE findings with patient and need for additional workup. Monitor demonstrates SR with frequent PAC, PVCs.    Overnight Events:    None     Objective Data:  Last Recorded Vitals:  Vitals:    12/04/24 0722 12/04/24 0750 12/04/24 1108 12/04/24 1143   BP: 116/81  120/74    BP Location: Right arm  Right arm    Patient Position: Lying  Lying    Pulse: 80  70    Resp: 18  18    Temp: 35.8 °C (96.4 °F)  36 °C (96.8 °F)    TempSrc: Temporal  Temporal    SpO2: 100% 91% 97% 92%   Weight:       Height:           Last Labs:  CBC - 12/4/2024:  2:59 AM  12.6 11.1 288    33.6      CMP - 12/4/2024:  2:59 AM  8.2 7.6 20 --- 0.3   _ 3.7 13 73      PTT - 12/3/2024:  9:40 AM  _   _ 31     TROPHS   Date/Time Value Ref Range Status   12/03/2024 08:30  0 - 13 ng/L Final     Comment:     Previous result verified on 12/3/2024 0356 on specimen/case [id number] [facility] Troponin I, High Sensitivity with value 164 ng/L.   12/03/2024 05:35  0 - 13 ng/L Final     Comment:     Previous result verified on 12/3/2024 0356 on specimen/case [id number] called with component [facility] for procedure Troponin I, High Sensitivity with value 164 ng/L.   12/03/2024 03:20  0 - 13 ng/L Final     BNP   Date/Time Value Ref Range Status   12/03/2024 03:20 AM 2,654 0 - 99 pg/mL Final     HGBA1C   Date/Time Value Ref Range Status   12/03/2024 08:30 AM 6.3 See comment % Final     LDLCALC   Date/Time Value Ref Range Status   12/03/2024 05:35 AM 61 <=99 mg/dL Final     Comment:                                 Near   Borderline      AGE      Desirable  Optimal    High     High     Very High     0-19 Y     0 - 109     ---    110-129   >/= 130     ----    20-24 Y     0 - 119      ---    120-159   >/= 160     ----      >24 Y     0 -  99   100-129  130-159   160-189     >/=190       VLDL   Date/Time Value Ref Range Status   12/03/2024 05:35 AM 11 0 - 40 mg/dL Final      Last I/O:  I/O last 3 completed shifts:  In: 7042.8 (131.2 mL/kg) [I.V.:6892.8 (128.4 mL/kg); IV Piggyback:150]  Out: 800 (14.9 mL/kg) [Urine:800 (0.4 mL/kg/hr)]  Weight: 53.7 kg     Past Cardiology Tests (Last 3 Years):  EKG:  No results found for this or any previous visit from the past 1095 days.    Echo:  Transthoracic Echo (TTE) Complete 12/03/2024  1. The left ventricular systolic function is moderately decreased, with a Guerrero's biplane calculated ejection fraction of 40%.   2. There is global hypokinesis of the left ventricle with minor regional variations.   3. Left ventricular diastolic filling was indeterminate.   4. There is moderately reduced right ventricular systolic function.   5. Aortic valve stenosis is not present.   6. Mild aortic valve regurgitation.   7. Lipomatous hypertrophy of the atrial septum.   8. There is moderately increased septal and severely increased posterior left ventricular wall thickness.     Ejection Fractions:  EF   Date/Time Value Ref Range Status   12/03/2024 04:12 PM 40 %      Cath:  No results found for this or any previous visit from the past 1095 days.    Stress Test:  No results found for this or any previous visit from the past 1095 days.    Cardiac Imaging:  No results found for this or any previous visit from the past 1095 days.      Inpatient Medications:  Scheduled medications   Medication Dose Route Frequency    acetaminophen  975 mg oral Once    carvedilol  3.125 mg oral BID    cefTRIAXone  1 g intravenous q24h    doxycycline  100 mg intravenous q12h    furosemide  40 mg intravenous q12h    insulin lispro  0-5 Units subcutaneous TID AC    ipratropium-albuteroL  3 mL nebulization TID    methylPREDNISolone sodium succinate (PF)  40 mg intravenous q24h    nicotine  1 patch  transdermal Daily    perflutren lipid microspheres  0.5-10 mL of dilution intravenous Once in imaging    sacubitriL-valsartan  0.5 tablet oral BID    sennosides  2 tablet oral BID    spironolactone  25 mg oral Daily     PRN medications   Medication    acetaminophen    dextrose    dextrose    glucagon    glucagon    heparin    melatonin    nitroglycerin     Continuous Medications   Medication Dose Last Rate    heparin  0-4,500 Units/hr 1,200 Units/hr (12/04/24 9670)    nitroglycerin  5-200 mcg/min Stopped (12/03/24 7283)       Physical Exam:  Physical Exam  Vitals reviewed.   Constitutional:       Appearance: Normal appearance.   HENT:      Head: Normocephalic.      Mouth/Throat:      Mouth: Mucous membranes are moist.   Cardiovascular:      Rate and Rhythm: Normal rate. Rhythm regularly irregular.      Pulses: Normal pulses.      Heart sounds: Normal heart sounds. No murmur heard.     No friction rub. No gallop.   Pulmonary:      Effort: Pulmonary effort is normal.      Breath sounds: Rales (Bibasilar) present. No wheezing or rhonchi.   Abdominal:      General: Bowel sounds are normal.      Palpations: Abdomen is soft.   Musculoskeletal:         General: Normal range of motion.      Cervical back: Normal range of motion.      Right lower leg: Edema (trace) present.      Left lower leg: Edema (trace) present.   Skin:     General: Skin is warm and dry.   Neurological:      General: No focal deficit present.      Mental Status: She is alert and oriented to person, place, and time.   Psychiatric:         Mood and Affect: Mood normal.         Behavior: Behavior normal.         Thought Content: Thought content normal.         Judgment: Judgment normal.           Assessment/Plan   1) Acute systolic and diastolic heart failure  IV diuresis  Echo  12/4/24: TTE with reduced LVEF 40%, moderately reduced right ventricular systolic function, moderately increased septal and severely increased posterior left ventricular wall  "thickness. Discussed results of TTE results with patient and she reports that she has significant family history of \"genetic heart failure\" with her mother dying in her 50s after heart transplant and maternal grandfather passing away \"at young age\" from hear failure.    Patient remains hypervolemic on exam with continued need for IV diuresis, possible transition to oral tomorrow.   - HF GDMT: Beta blocker: Carvedilol 3.125 mg BID  ACE inhibitor/ARB/ARNI: Initiate Sacubitril-valsartan 24-26 mg 0.5 tablet BID  MRA: Initiate spironolactone 25 mg daily  SGLT2i:   Start after L/RHC   Diuretic: Continue on Furosemide 40 mg BID  Hydralazine/Nitrate: None    -Plan for L/RHC with Dr. Cadena tomorrow, 12/5 if patient's respiratory status continues to improve. NPO after midnight.     2) Afib  Now in NSR  Will start IV Heparin gtt with eventual transition to anticoagulation    12/4/24: Patient remains in SA, with frequent PAC, PVCs. Maintain on Heparin infusion in anticipation of L/RHC tentatively scheduled for tomorrow.       Code Status:  Full Code    Alida Gonzalez, APRN-CNP  "

## 2024-12-04 NOTE — PROGRESS NOTES
12/04/24 1225   Discharge Planning   Living Arrangements Spouse/significant other;Children;Family members   Support Systems Spouse/significant other;Children   Assistance Needed none   Home or Post Acute Services None   Expected Discharge Disposition Home   Does the patient need discharge transport arranged? No   Intensity of Service   Intensity of Service 0-30 min     Met with patient at bedside, introduced self and role as RN TCC. Patient lives at home with spouse, daughter and grandson. She states she has not seen a doctor in many many years, she has been content at home and feeling well. Patient states she is still independent in her own personal care as well as her care at home. Cooks, cleans, laundry, etc. Spouse and daughter assist with grocery shopping. Patient does not take meds nor sees a PCP. Will follow for establishing a PCP if patient is accepting and agreeable. She is admitted for respiratory concerns as well as pulm edema, she is a smoker. Currently on 4L NC, no home o2, she was on BIPAP as well at some point. She will need weaned as tolerated on her O2. Preference is to return home when medically ready. TCC to follow.

## 2024-12-05 LAB
ACT BLD: 237 SEC (ref 83–199)
ANION GAP SERPL CALC-SCNC: 10 MMOL/L (ref 10–20)
BASE EXCESS BLDA CALC-SCNC: -4.4 MMOL/L (ref -2–3)
BASE EXCESS BLDV CALC-SCNC: 2.8 MMOL/L (ref -2–3)
BASE EXCESS BLDV CALC-SCNC: 3.1 MMOL/L (ref -2–3)
BODY TEMPERATURE: 37 DEGREES CELSIUS
BUN SERPL-MCNC: 40 MG/DL (ref 6–23)
CALCIUM SERPL-MCNC: 8.6 MG/DL (ref 8.6–10.3)
CHLORIDE SERPL-SCNC: 103 MMOL/L (ref 98–107)
CO2 SERPL-SCNC: 29 MMOL/L (ref 21–32)
CREAT SERPL-MCNC: 0.72 MG/DL (ref 0.5–1.05)
EGFRCR SERPLBLD CKD-EPI 2021: >90 ML/MIN/1.73M*2
ERYTHROCYTE [DISTWIDTH] IN BLOOD BY AUTOMATED COUNT: 15.9 % (ref 11.5–14.5)
GLUCOSE BLD MANUAL STRIP-MCNC: 127 MG/DL (ref 74–99)
GLUCOSE BLD MANUAL STRIP-MCNC: 131 MG/DL (ref 74–99)
GLUCOSE BLD MANUAL STRIP-MCNC: 152 MG/DL (ref 74–99)
GLUCOSE BLD MANUAL STRIP-MCNC: 188 MG/DL (ref 74–99)
GLUCOSE SERPL-MCNC: 108 MG/DL (ref 74–99)
HCO3 BLDA-SCNC: 22.1 MMOL/L (ref 22–26)
HCO3 BLDV-SCNC: 27 MMOL/L (ref 22–26)
HCO3 BLDV-SCNC: 27.2 MMOL/L (ref 22–26)
HCT VFR BLD AUTO: 38 % (ref 36–46)
HGB BLD-MCNC: 12.4 G/DL (ref 12–16)
MAGNESIUM SERPL-MCNC: 1.97 MG/DL (ref 1.6–2.4)
MCH RBC QN AUTO: 31.1 PG (ref 26–34)
MCHC RBC AUTO-ENTMCNC: 32.6 G/DL (ref 32–36)
MCV RBC AUTO: 95 FL (ref 80–100)
NRBC BLD-RTO: 0 /100 WBCS (ref 0–0)
OXYHGB MFR BLDA: 88.8 % (ref 94–98)
OXYHGB MFR BLDV: 56.7 % (ref 45–75)
OXYHGB MFR BLDV: 57.7 % (ref 45–75)
PCO2 BLDA: 45 MM HG (ref 38–42)
PCO2 BLDV: 38 MM HG (ref 41–51)
PCO2 BLDV: 40 MM HG (ref 41–51)
PH BLDA: 7.3 PH (ref 7.38–7.42)
PH BLDV: 7.44 PH (ref 7.33–7.43)
PH BLDV: 7.46 PH (ref 7.33–7.43)
PLATELET # BLD AUTO: 352 X10*3/UL (ref 150–450)
PO2 BLDA: 59 MM HG (ref 85–95)
PO2 BLDV: 34 MM HG (ref 35–45)
PO2 BLDV: 34 MM HG (ref 35–45)
POTASSIUM SERPL-SCNC: 3.7 MMOL/L (ref 3.5–5.3)
RBC # BLD AUTO: 3.99 X10*6/UL (ref 4–5.2)
S PNEUM AG UR QL: NEGATIVE
SAO2 % BLDA: 91 % (ref 94–100)
SAO2 % BLDV: 58 % (ref 45–75)
SAO2 % BLDV: 58 % (ref 45–75)
SODIUM SERPL-SCNC: 138 MMOL/L (ref 136–145)
UFH PPP CHRO-ACNC: 0.3 IU/ML
WBC # BLD AUTO: 14.3 X10*3/UL (ref 4.4–11.3)

## 2024-12-05 PROCEDURE — 2500000004 HC RX 250 GENERAL PHARMACY W/ HCPCS (ALT 636 FOR OP/ED): Performed by: INTERNAL MEDICINE

## 2024-12-05 PROCEDURE — 80048 BASIC METABOLIC PNL TOTAL CA: CPT | Performed by: INTERNAL MEDICINE

## 2024-12-05 PROCEDURE — 85027 COMPLETE CBC AUTOMATED: CPT | Performed by: INTERNAL MEDICINE

## 2024-12-05 PROCEDURE — C1760 CLOSURE DEV, VASC: HCPCS | Performed by: INTERNAL MEDICINE

## 2024-12-05 PROCEDURE — 94640 AIRWAY INHALATION TREATMENT: CPT

## 2024-12-05 PROCEDURE — B2111ZZ FLUOROSCOPY OF MULTIPLE CORONARY ARTERIES USING LOW OSMOLAR CONTRAST: ICD-10-PCS | Performed by: INTERNAL MEDICINE

## 2024-12-05 PROCEDURE — 94660 CPAP INITIATION&MGMT: CPT

## 2024-12-05 PROCEDURE — 93460 R&L HRT ART/VENTRICLE ANGIO: CPT | Performed by: INTERNAL MEDICINE

## 2024-12-05 PROCEDURE — 82805 BLOOD GASES W/O2 SATURATION: CPT

## 2024-12-05 PROCEDURE — 2500000002 HC RX 250 W HCPCS SELF ADMINISTERED DRUGS (ALT 637 FOR MEDICARE OP, ALT 636 FOR OP/ED): Performed by: NURSE PRACTITIONER

## 2024-12-05 PROCEDURE — 82810 BLOOD GASES O2 SAT ONLY: CPT

## 2024-12-05 PROCEDURE — 2500000001 HC RX 250 WO HCPCS SELF ADMINISTERED DRUGS (ALT 637 FOR MEDICARE OP): Performed by: CLINICAL NURSE SPECIALIST

## 2024-12-05 PROCEDURE — S4991 NICOTINE PATCH NONLEGEND: HCPCS | Performed by: NURSE PRACTITIONER

## 2024-12-05 PROCEDURE — 85347 COAGULATION TIME ACTIVATED: CPT

## 2024-12-05 PROCEDURE — 83735 ASSAY OF MAGNESIUM: CPT | Performed by: INTERNAL MEDICINE

## 2024-12-05 PROCEDURE — C1769 GUIDE WIRE: HCPCS | Performed by: INTERNAL MEDICINE

## 2024-12-05 PROCEDURE — 2500000001 HC RX 250 WO HCPCS SELF ADMINISTERED DRUGS (ALT 637 FOR MEDICARE OP): Performed by: NURSE PRACTITIONER

## 2024-12-05 PROCEDURE — 99233 SBSQ HOSP IP/OBS HIGH 50: CPT | Performed by: INTERNAL MEDICINE

## 2024-12-05 PROCEDURE — 2060000001 HC INTERMEDIATE ICU ROOM DAILY

## 2024-12-05 PROCEDURE — 2500000004 HC RX 250 GENERAL PHARMACY W/ HCPCS (ALT 636 FOR OP/ED): Performed by: NURSE PRACTITIONER

## 2024-12-05 PROCEDURE — 85520 HEPARIN ASSAY: CPT | Performed by: INTERNAL MEDICINE

## 2024-12-05 PROCEDURE — 2500000005 HC RX 250 GENERAL PHARMACY W/O HCPCS: Performed by: NURSE PRACTITIONER

## 2024-12-05 PROCEDURE — 82947 ASSAY GLUCOSE BLOOD QUANT: CPT

## 2024-12-05 PROCEDURE — C1894 INTRO/SHEATH, NON-LASER: HCPCS | Performed by: INTERNAL MEDICINE

## 2024-12-05 PROCEDURE — 99152 MOD SED SAME PHYS/QHP 5/>YRS: CPT | Performed by: INTERNAL MEDICINE

## 2024-12-05 PROCEDURE — 2500000001 HC RX 250 WO HCPCS SELF ADMINISTERED DRUGS (ALT 637 FOR MEDICARE OP): Performed by: INTERNAL MEDICINE

## 2024-12-05 PROCEDURE — C1887 CATHETER, GUIDING: HCPCS | Performed by: INTERNAL MEDICINE

## 2024-12-05 PROCEDURE — 4A023N8 MEASUREMENT OF CARDIAC SAMPLING AND PRESSURE, BILATERAL, PERCUTANEOUS APPROACH: ICD-10-PCS | Performed by: INTERNAL MEDICINE

## 2024-12-05 PROCEDURE — 2500000002 HC RX 250 W HCPCS SELF ADMINISTERED DRUGS (ALT 637 FOR MEDICARE OP, ALT 636 FOR OP/ED): Performed by: CLINICAL NURSE SPECIALIST

## 2024-12-05 PROCEDURE — 2500000002 HC RX 250 W HCPCS SELF ADMINISTERED DRUGS (ALT 637 FOR MEDICARE OP, ALT 636 FOR OP/ED): Performed by: INTERNAL MEDICINE

## 2024-12-05 PROCEDURE — 2550000001 HC RX 255 CONTRASTS: Performed by: INTERNAL MEDICINE

## 2024-12-05 PROCEDURE — 2720000007 HC OR 272 NO HCPCS: Performed by: INTERNAL MEDICINE

## 2024-12-05 PROCEDURE — 36415 COLL VENOUS BLD VENIPUNCTURE: CPT | Performed by: INTERNAL MEDICINE

## 2024-12-05 RX ORDER — FENTANYL CITRATE 50 UG/ML
INJECTION, SOLUTION INTRAMUSCULAR; INTRAVENOUS AS NEEDED
Status: DISCONTINUED | OUTPATIENT
Start: 2024-12-05 | End: 2024-12-05 | Stop reason: HOSPADM

## 2024-12-05 RX ORDER — SODIUM CHLORIDE 9 MG/ML
75 INJECTION, SOLUTION INTRAVENOUS CONTINUOUS
Status: ACTIVE | OUTPATIENT
Start: 2024-12-05 | End: 2024-12-05

## 2024-12-05 RX ORDER — FUROSEMIDE 20 MG/1
20 TABLET ORAL DAILY
Status: DISCONTINUED | OUTPATIENT
Start: 2024-12-05 | End: 2024-12-06 | Stop reason: HOSPADM

## 2024-12-05 RX ORDER — SODIUM CHLORIDE 9 MG/ML
1000 INJECTION, SOLUTION INTRAVENOUS ONCE AS NEEDED
Status: ACTIVE | OUTPATIENT
Start: 2024-12-05 | End: 2024-12-05

## 2024-12-05 RX ORDER — MIDAZOLAM HYDROCHLORIDE 1 MG/ML
INJECTION, SOLUTION INTRAMUSCULAR; INTRAVENOUS AS NEEDED
Status: DISCONTINUED | OUTPATIENT
Start: 2024-12-05 | End: 2024-12-05 | Stop reason: HOSPADM

## 2024-12-05 ASSESSMENT — PAIN - FUNCTIONAL ASSESSMENT
PAIN_FUNCTIONAL_ASSESSMENT: 0-10

## 2024-12-05 ASSESSMENT — PAIN SCALES - GENERAL
PAINLEVEL_OUTOF10: 0 - NO PAIN

## 2024-12-05 NOTE — CARE PLAN
The patient's goals for the shift include      The clinical goals for the shift include pt. will have no complaints of SOB during shift      Problem: Fall/Injury  Goal: Not fall by end of shift  Outcome: Progressing  Goal: Be free from injury by end of the shift  Outcome: Progressing  Goal: Verbalize understanding of personal risk factors for fall in the hospital  Outcome: Progressing  Goal: Verbalize understanding of risk factor reduction measures to prevent injury from fall in the home  Outcome: Progressing  Goal: Use assistive devices by end of the shift  Outcome: Progressing  Goal: Pace activities to prevent fatigue by end of the shift  Outcome: Progressing     Problem: Pain  Goal: Takes deep breaths with improved pain control throughout the shift  Outcome: Progressing  Goal: Turns in bed with improved pain control throughout the shift  Outcome: Progressing  Goal: Walks with improved pain control throughout the shift  Outcome: Progressing  Goal: Performs ADL's with improved pain control throughout shift  Outcome: Progressing  Goal: Participates in PT with improved pain control throughout the shift  Outcome: Progressing  Goal: Free from opioid side effects throughout the shift  Outcome: Progressing  Goal: Free from acute confusion related to pain meds throughout the shift  Outcome: Progressing     Problem: Skin  Goal: Decreased wound size/increased tissue granulation at next dressing change  Outcome: Progressing  Goal: Participates in plan/prevention/treatment measures  Outcome: Progressing  Goal: Prevent/manage excess moisture  Outcome: Progressing  Goal: Prevent/minimize sheer/friction injuries  Outcome: Progressing  Goal: Promote/optimize nutrition  Outcome: Progressing  Goal: Promote skin healing  Outcome: Progressing

## 2024-12-05 NOTE — POST-PROCEDURE NOTE
Physician Transition of Care Summary  Invasive Cardiovascular Lab    Procedure Date: 12/5/2024  Attending:    * Adeel Cadena - Primary  Resident/Fellow/Other Assistant: Surgeons and Role:  * No surgeons found with a matching role *    Indications:   Pre-op Diagnosis      * Acute combined systolic and diastolic heart failure [I50.41]    Post-procedure diagnosis:   Post-op Diagnosis     * Acute combined systolic and diastolic heart failure [I50.41]    Procedure(s):   Left And Right Heart Cath, With LV  89850 - [place] R & L HRT CATH W/NJX L VENTRICULOG IMG S&I        Procedure Findings:   Right Heart Catheterization    RHC:  RA: 2  RV: 32/0, 4  PA: 38/11 (21)  PCWP: 7  CO & CI: preserved     LHC: mild CAD of the LAD, 40% distal RCA    Description of the Procedure:   L&RHC   RRA>TR band   RBV>peripheral IV     Complications:   None     Stents/Implants:   Implants       No implant documentation for this case.            Anticoagulation/Antiplatelet Plan:   NA    Estimated Blood Loss:   * No values recorded between 12/5/2024  1:50 PM and 12/5/2024  2:33 PM *    Anesthesia: Moderate Sedation Anesthesia Staff: No anesthesia staff entered.    Any Specimen(s) Removed:   No specimens collected during this procedure.    Disposition:   SDU     Recs:   Non-ischemic cardiomyopathy   Treat mild CAD medically   Stop IV lasix and transition to PO lasix tomorrow   Aldactone- patient with low filling pressures   CXR in AM       Electronically signed by: ROE Hannah-CNP, 12/5/2024 2:33 PM

## 2024-12-05 NOTE — PRE-SEDATION DOCUMENTATION
Cardiology Preprocedure Note    Maria D Bourgeois   Indication for procedure: The primary encounter diagnosis was Sepsis (Multi). Diagnoses of Flash pulmonary edema, Pneumonia of both lungs due to infectious organism, unspecified part of lung, Acute respiratory failure with hypercapnia (Multi), and Acute combined systolic and diastolic heart failure were also pertinent to this visit. Here for L&RHC.        /78 (BP Location: Left arm, Patient Position: Lying)   Pulse 68   Temp 36.4 °C (97.6 °F) (Temporal)   Resp 16   Ht 1.524 m (5')   Wt 54.7 kg (120 lb 9.5 oz)   SpO2 92%   BMI 23.55 kg/m²    Relevant Labs:   Lab Results   Component Value Date    CREATININE 0.72 12/05/2024    EGFR >90 12/05/2024       Planned Sedation/Anesthesia: Moderate    Airway assessment: normal    Directed physical examination: General: Alert and Oriented, No distress, cooperative. Lungs: Clear to auscultation bilaterally, no wheezes, rhonci, or rales. respirations unlabored Heart: regular rate and rhythm, S1 and S2, no murmur     Mallampati: II (hard and soft palate, upper portion of tonsils and uvula visible)    ASA Score: ASA 2 - Patient with mild systemic disease with no functional limitations    Benefits, risks and alternatives of procedure and planned sedation have been discussed with the patient and/or their representative. All questions answered and they agree to proceed.     Yuridia Cardona, APRN-CNP

## 2024-12-05 NOTE — PROGRESS NOTES
Maria D Bourgeois is a 69 y.o. female on day 2 of admission presenting with Sepsis (Multi).      Subjective   Maria D Bourgeois is a 69 y.o. female with no PMHx (she has not seen a provider in 20 yrs and takes no meds)  who presented with shortness of breath since earlier today, along with a cough. She is a smoker but is never been formally diagnosed with COPD. She denied fevers, chest pain, leg edema. Upon arrival she was in respiratory distress and placed on BiPAP. Her systolic blood pressure was initially in the 220s. Point-of-care ultrasound revealed showed multiple B-lines consistent with flash pulmonary edema. She was given multiple doses of sublingual nitroglycerin and placed on a nitroglycerin drip. She required a couple doses of benzodiazepines for anxiety and air hunger. She was found to be in A-fib with RVR and given 5 mg IV metoprolol. She was able to be weaned off the NTG gtt.  Initial VBG showed a respiratory acidosis with improvement on repeat. Remainder of ROS reviewed and negative except as indicated in HPI.      ED Course:  Vitals on presentation: T 38C  RR 47 /120 O2 91%/NRB  Remarkable labs: , Na 132, BNP 2654, troponin 164>224, d-dimer 4570, no CBC as yet  EKG: EKG shows sinus rhythm with multiple PACs. T wave inversions across the anterior lateral leads. No STEMI. Normal intervals and axis   Imaging: CTA chest: Developing left lower lobe consolidation with patchy areas of airspace  disease in the right middle lobe and right lower lobe indicating multilobar pneumonia in the appropriate clinical setting. Cardiomegaly with mild pulmonary edema and trace bilateral pleural effusions suggesting CHF. No definite pulmonary embolus.  Interventions: Blood cultures, ceftriaxone, doxycycline, Solumedrol IV Lasix, Duoneb, NTG gtt  12/4: Patient was seen and examined.  Still requiring 4 L nasal cannula oxygen to maintain saturation.  Echocardiogram shows ejection fraction of 40% with hypokinesia.   Cardiologist on board and plans cardiac catheterization within the next 24 hours.  A-fib is rate controlled but with intermittent RVR.  Continue IV heparin drip anticoagulation.  Blood cultures are negative x 1 urine Legionella is negative and urine strep antigen is still pending.  Continue current IV antibiotics.  Hypokalemia noted and potassium supplementation given.  WBC trended up.  Will consider adding steroids to breathing treatments in view of patient's extensive smoking history and potential COPD undiagnosed.  Continue to trend CBC and BMP daily  12/5: Patient was seen and examined.  Still requiring nasal cannula oxygen to maintain saturation.  -1100 mL since admission.  Right and left heart catheterization planned for today by cardiologist.  Continue GDMT.  Hypokalemia resolved.  Leukocytosis progressively worsened likely related to steroid therapy.  Continue current antibiotics.  Continue with as needed DuoNebs.  Repeat magnesium, CBC and BMP in a.m.      Objective     Last Recorded Vitals  /86 (BP Location: Right arm, Patient Position: Lying)   Pulse 56   Temp 36.3 °C (97.4 °F) (Temporal)   Resp 18   Wt 54.7 kg (120 lb 9.5 oz)   SpO2 92%   Intake/Output last 3 Shifts:    Intake/Output Summary (Last 24 hours) at 12/5/2024 1223  Last data filed at 12/5/2024 1054  Gross per 24 hour   Intake 448 ml   Output 2000 ml   Net -1552 ml       Admission Weight  Weight: 58.3 kg (128 lb 8.5 oz) (12/03/24 0655)    Daily Weight  12/05/24 : 54.7 kg (120 lb 9.5 oz)    Image Results  Transthoracic Echo (TTE) [place] [id number]       Phone [phone] Fax [phone]    TRANSTHORACIC ECHOCARDIOGRAM REPORT    Patient Name:       SUHAS Morton Physician:    [id number] Adeel Cadena MD  Study Date:         12/3/2024            Ordering Provider:    Juan ARELLANO                                                                  BRIGITTEMARYCARMEN  MRN/PID:            [id number]             Fellow:  Accession#:         [id number]         Nurse:  Date of Birth/Age:  1955 / 69 years Sonographer:          Anali Han RDCS  Gender Assigned at  F                    Additional Staff:  Birth:  Height:             152.40 cm            Admit Date:           12/2/2024  Weight:             58.06 kg             Admission Status:     Inpatient -                                                                 Routine  BSA / BMI:          1.54 m2 / 25.00      Department Location:  [facility]                      kg/m2  Blood Pressure: 104 /59 mmHg    Study Type:    TRANSTHORACIC ECHO (TTE) COMPLETE  Diagnosis/ICD: Acute pulmonary edema-J81.0; Acute respiratory failure with                 hypercapnia-J96.02  Indication:    Acute respiratory failure, Flash pulmonary edema  CPT Codes:     Echo Complete w Full Doppler-[id number]    Patient History:  Pertinent History: Dyspnea and Pulmonary edema.    Study Detail: The following Echo studies were performed: 2D, M-Mode, Doppler and                color flow.       PHYSICIAN INTERPRETATION:  Left Ventricle: The left ventricular systolic function is moderately decreased, with a Guerrero's biplane calculated ejection fraction of 40%. There is severe concentric left ventricular hypertrophy. There is global hypokinesis of the left ventricle with minor regional variations. The left ventricular cavity size is normal. There is moderately increased septal and severely increased posterior left ventricular wall thickness. Left ventricular diastolic filling was indeterminate.  Left Atrium: The left atrium is normal in size. Increased thickness of the atrial septum (sparing the fossa ovalis) is present. This is consistent with lipomatous hypertrophy of the atrial septum.  Right Ventricle: The right ventricle is normal in size. There is moderately reduced right ventricular systolic  function.  Right Atrium: The right atrium is normal in size.  Aortic Valve: The aortic valve is trileaflet. There is no evidence of aortic valve stenosis.  There is mild aortic valve regurgitation.  Mitral Valve: The mitral valve is mildly thickened. There is mild mitral annular calcification. There is trace to mild mitral valve regurgitation.  Tricuspid Valve: The tricuspid valve is structurally normal. No evidence of tricuspid regurgitation.  Pulmonic Valve: The pulmonic valve is structurally normal. There is no indication of pulmonic valve regurgitation.  Pericardium: No pericardial effusion noted.  Aorta: The aortic root is abnormal. The aortic root is at the upper limits of normal size.  Systemic Veins: The inferior vena cava appears normal in size.       CONCLUSIONS:   1. The left ventricular systolic function is moderately decreased, with a Guerrero's biplane calculated ejection fraction of 40%.   2. There is global hypokinesis of the left ventricle with minor regional variations.   3. Left ventricular diastolic filling was indeterminate.   4. There is moderately reduced right ventricular systolic function.   5. Aortic valve stenosis is not present.   6. Mild aortic valve regurgitation.   7. Lipomatous hypertrophy of the atrial septum.   8. There is moderately increased septal and severely increased posterior left ventricular wall thickness.    QUANTITATIVE DATA SUMMARY:     2D MEASUREMENTS:            Normal Ranges:  Ao Root d:       3.80 cm    (2.0-3.7cm)  IVSd:            1.40 cm    (0.6-1.1cm)  LVPWd:           1.59 cm    (0.6-1.1cm)  LVIDd:           4.44 cm    (3.9-5.9cm)  LVIDs:           3.92 cm  LV Mass Index:   174.2 g/m2  LV % FS          11.7 %       LA VOLUME:                    Normal Ranges:  LA Vol A4C:        38.9 ml    (22+/-6mL/m2)  LA Vol A2C:        51.2 ml  LA Vol BP:         48.2 ml  LA Vol Index A4C:  25.2ml/m2  LA Vol Index A2C:  33.1 ml/m2  LA Vol Index BP:   31.2 ml/m2  LA Area A4C:        16.0 cm2  LA Area A2C:       17.0 cm2  LA Major Axis A4C: 5.6 cm  LA Major Axis A2C: 4.8 cm  LA Volume Index:   30.0 ml/m2       RA VOLUME BY A/L METHOD:         Normal Ranges:  RA Area A4C:             9.5 cm2       M-MODE MEASUREMENTS:         Normal Ranges:  Ao Root:             3.40 cm (2.0-3.7cm)  AoV Exc:             2.00 cm (1.5-2.5cm)  LAs:                 3.20 cm (2.7-4.0cm)       AORTA MEASUREMENTS:         Normal Ranges:  AoV Exc:            2.00 cm (1.5-2.5cm)  Ao Sinus, d:        3.80 cm (2.1-3.5cm)  Asc Ao, d:          3.30 cm (2.1-3.4cm)       LV SYSTOLIC FUNCTION BY 2D PLANIMETRY (MOD):                       Normal Ranges:  EF-A4C View:    45 % (>=55%)  EF-A2C View:    34 %  EF-Biplane:     40 %  LV EF Reported: 40 %       LV DIASTOLIC FUNCTION:            Normal Ranges:  MV Peak E:             0.46 m/s   (0.7-1.2 m/s)  MV Peak A:             0.88 m/s   (0.42-0.7 m/s)  E/A Ratio:             0.52       (1.0-2.2)  MV e'                  0.032 m/s  (>8.0)  MV lateral e'          0.03 m/s  MV medial e'           0.03 m/s  MV A Dur:              87.00 msec  E/e' Ratio:            14.52      (<8.0)       AORTIC VALVE:           Normal Ranges:  LVOT Max Facundo:  1.01 m/s (<=1.1m/s)  LVOT VTI:      13.80 cm  LVOT Diameter: 2.00 cm  (1.8-2.4cm)       AORTIC INSUFFICIENCY:  AI Vmax:       4.16 m/s  AI Half-time:  1039 msec  AI Decel Rate: 141.00 cm/s2       RIGHT VENTRICLE:  RV Basal 3.31 cm  RV Mid   1.79 cm  RV Major 7.6 cm  TAPSE:   14.6 mm  RV s'    0.10 m/s       TRICUSPID VALVE/RVSP:          Normal Ranges:  TV E Vmax:            0.20 m/s (0.3-0.7m/s)  IVC Diam:             1.64 cm       PULMONIC VALVE:          Normal Ranges:  PV Max Facundo:     1.1 m/s  (0.6-0.9m/s)  PV Max P.6 mmHg       [id number] Adeel Cadena MD  Electronically signed on 12/3/2024 at 5:57:30 PM       ** Final **  CT angio chest for pulmonary embolism  Narrative: STUDY:  CT Angiogram of the Chest; 2024 5:26  AM  INDICATION:  Shortness of breath.  Elevated D-dimer.  COMPARISON:  None Available.  ACCESSION NUMBER(S):  [id number]  ORDERING CLINICIAN:  PAO MORALES  TECHNIQUE:  CTA of the chest was performed with intravenous contrast.   Images are reviewed and processed at a workstation according to the CT  angiogram protocol with 3-D and/or MIP post processing imaging  generated.  Automated mA/kV exposure control was utilized and patient examination  was performed in strict accordance with principles of ALARA.  FINDINGS:  Pulmonary arteries are adequately opacified without acute or chronic  filling defects.  Respiratory motion slightly limits evaluation of the  smaller pulmonary arteries.  The thoracic aorta is normal in course  and caliber without dissection or aneurysm.  The heart is mild to moderate calcified atheromatous plaque is seen in  the thoracic aorta.  Mildly enlarged in size without pericardial  effusion.  Thoracic lymph nodes are not enlarged.  Trachea and mainstem bronchi are patent and clear of debris.  There is  developing consolidation in the left lower lobe with patchy areas of  groundglass airspace disease in the right middle lobe and right lower  lobe.  There is mild pulmonary edema.  Atelectasis is seen in both  lungs.  Mild biapical centrilobular emphysema is noted.  Trace  dependently layering bilateral pleural effusions are present.  No  interstitial lung disease or definite suspicious pulmonary nodules are  identified.  Upper abdomen demonstrates no acute pathology.  There are no acute fractures.  No suspicious bony lesions.   Generalized osseous demineralization is noted with mild to moderate  multilevel disc space narrowing throughout the mid and lower thoracic  spine.  There is a slightly pronounced thoracic kyphosis.  Mild  chronic superior endplate compression of T11 is noted with loss of 25%  of anterior vertebral body height.  There is a mild levoconvex  curvature of the lower thoracic  and lumbar spine centered in the  T12-L1 level.  Impression: Developing left lower lobe consolidation with patchy areas of airspace  disease in the right middle lobe and right lower lobe indicating  multilobar pneumonia in the appropriate clinical setting.  Cardiomegaly with mild pulmonary edema and trace bilateral pleural  effusions suggesting CHF.  No definite large central pulmonary embolus identified.  Signed by Chacorta Cadena      Physical Exam  Constitutional: Well developed, sedated, calm, no acute distress, cooperative   Eyes: EOMI, clear sclerae   ENMT: mucous membranes moist, no lesions seen   Head/Neck: Neck supple, no apparent injury, head atraumatic   Respiratory/Thorax: CTAB, good chest expansion, respirations even and unlabored, pt on BIPAP   Cardiovascular: Regular rate and rhythm, no murmurs/rubs/gallops, normal S1 and S 2   Gastrointestinal: Abdomen nondistended, soft, nontender, +BS, no bruits   Musculoskeletal: ROM intact, no joint swelling, normal  strength   Extremities: no cyanosis, edema, contusions or clubbing   Neurological: no focal deficit, pt sedated   Psychological: pt sedated   Skin: Warm and dry, no lesions, no rashes     Relevant Results               Assessment/Plan   This patient currently has cardiac telemetry ordered; if you would like to modify or discontinue the telemetry order, click here to go to the orders activity to modify/discontinue the order.              Assessment & Plan  Sepsis (Multi)    Acute combined systolic and diastolic heart failure    .  Sepsis due to bilateral CAP  Continue empiric ceftriaxone and doxycycline as well as Solumedrol and Duoneb  Blood cultures negative x 1 day,   Urine Legionella negative; strep pending  Continue as needed DuoNebs  Continue Solu-Medrol  Trend CBC daily     Acute systolic congestive heart failure  Continue IV Lasix twice daily  Strict input output chart  Daily weight  Echocardiogram done shows ejection fraction of 40% with global  hypokinesia  Started on GDMT with carvedilol, Entresto, spironolactone, SGLT2 to be started possibly after cath    Acute hypoxic respiratory failure  Likely multifactorial from CHF, pneumonia and likely undiagnosed COPD  Continue nasal cannula oxygen  Due to pulmonary edema, wean BIPAP off as tolerated to maintain O2 sat >91%, pt may need home O2 eval      Hypertensive emergency  Resolved     NSTEMI  Likely demand ischemia given above, will trend troponins   Echocardiogram shows EF of 40% with global hypokinesia  On IV heparin drip for A-fib  Cardiologist plans left heart catheterization        Tobacco dependence   Pt has never been formally diagnosed with COPD  She has smoked 1 ppd since the 6th grade, will start nicotine patch     Hyperglycemia  No gx diabetes, check A1c and start empiric SSI protocol     A-fib with RVR   Rate controlled on p.o. carvedilol  Continue IV heparin.  Echocardiogram shows ejection fraction of 40% with global hypokinesia  Cardiologist on board and recommendations appreciated       Pt has not seen a provider in 20 yrs and takes no meds   Check TSH and lipid profile     DVT ppx: Lovenox     Discharge disposition  Home when stable        Spent 35 minutes in the follow-up management of this patient today       Mulugeta De Leon MD

## 2024-12-06 ENCOUNTER — APPOINTMENT (OUTPATIENT)
Dept: RADIOLOGY | Facility: HOSPITAL | Age: 69
End: 2024-12-06
Payer: COMMERCIAL

## 2024-12-06 VITALS
RESPIRATION RATE: 18 BRPM | TEMPERATURE: 97.6 F | SYSTOLIC BLOOD PRESSURE: 126 MMHG | BODY MASS INDEX: 23.85 KG/M2 | WEIGHT: 121.47 LBS | HEART RATE: 78 BPM | DIASTOLIC BLOOD PRESSURE: 83 MMHG | OXYGEN SATURATION: 95 % | HEIGHT: 60 IN

## 2024-12-06 PROBLEM — I10 HYPERTENSION: Status: ACTIVE | Noted: 2024-12-06

## 2024-12-06 PROBLEM — M54.16 LUMBAR RADICULITIS: Status: ACTIVE | Noted: 2024-12-06

## 2024-12-06 PROBLEM — F41.9 ANXIETY DISORDER: Status: ACTIVE | Noted: 2024-12-06

## 2024-12-06 PROBLEM — I50.41 ACUTE COMBINED SYSTOLIC AND DIASTOLIC HEART FAILURE: Status: RESOLVED | Noted: 2024-12-03 | Resolved: 2024-12-06

## 2024-12-06 PROBLEM — A41.9 SEPSIS (MULTI): Status: RESOLVED | Noted: 2024-12-03 | Resolved: 2024-12-06

## 2024-12-06 PROBLEM — R09.89 CAROTID BRUIT: Status: ACTIVE | Noted: 2024-12-06

## 2024-12-06 LAB
ALBUMIN SERPL BCP-MCNC: 3.3 G/DL (ref 3.4–5)
ANION GAP SERPL CALC-SCNC: 13 MMOL/L (ref 10–20)
BUN SERPL-MCNC: 29 MG/DL (ref 6–23)
CALCIUM SERPL-MCNC: 8.6 MG/DL (ref 8.6–10.3)
CHLORIDE SERPL-SCNC: 105 MMOL/L (ref 98–107)
CO2 SERPL-SCNC: 24 MMOL/L (ref 21–32)
CREAT SERPL-MCNC: 0.67 MG/DL (ref 0.5–1.05)
EGFRCR SERPLBLD CKD-EPI 2021: >90 ML/MIN/1.73M*2
ERYTHROCYTE [DISTWIDTH] IN BLOOD BY AUTOMATED COUNT: 16 % (ref 11.5–14.5)
GLUCOSE BLD MANUAL STRIP-MCNC: 183 MG/DL (ref 74–99)
GLUCOSE BLD MANUAL STRIP-MCNC: 241 MG/DL (ref 74–99)
GLUCOSE BLD MANUAL STRIP-MCNC: 74 MG/DL (ref 74–99)
GLUCOSE SERPL-MCNC: 98 MG/DL (ref 74–99)
HCT VFR BLD AUTO: 43.5 % (ref 36–46)
HGB BLD-MCNC: 13.8 G/DL (ref 12–16)
MCH RBC QN AUTO: 30.6 PG (ref 26–34)
MCHC RBC AUTO-ENTMCNC: 31.7 G/DL (ref 32–36)
MCV RBC AUTO: 97 FL (ref 80–100)
NRBC BLD-RTO: 0 /100 WBCS (ref 0–0)
PHOSPHATE SERPL-MCNC: 2.6 MG/DL (ref 2.5–4.9)
PLATELET # BLD AUTO: 369 X10*3/UL (ref 150–450)
POTASSIUM SERPL-SCNC: 4.3 MMOL/L (ref 3.5–5.3)
RBC # BLD AUTO: 4.51 X10*6/UL (ref 4–5.2)
SODIUM SERPL-SCNC: 138 MMOL/L (ref 136–145)
UFH PPP CHRO-ACNC: 1.1 IU/ML
WBC # BLD AUTO: 12.5 X10*3/UL (ref 4.4–11.3)

## 2024-12-06 PROCEDURE — 82947 ASSAY GLUCOSE BLOOD QUANT: CPT

## 2024-12-06 PROCEDURE — 2500000001 HC RX 250 WO HCPCS SELF ADMINISTERED DRUGS (ALT 637 FOR MEDICARE OP): Performed by: NURSE PRACTITIONER

## 2024-12-06 PROCEDURE — 99232 SBSQ HOSP IP/OBS MODERATE 35: CPT | Performed by: NURSE PRACTITIONER

## 2024-12-06 PROCEDURE — 2500000002 HC RX 250 W HCPCS SELF ADMINISTERED DRUGS (ALT 637 FOR MEDICARE OP, ALT 636 FOR OP/ED): Performed by: INTERNAL MEDICINE

## 2024-12-06 PROCEDURE — 36415 COLL VENOUS BLD VENIPUNCTURE: CPT | Performed by: INTERNAL MEDICINE

## 2024-12-06 PROCEDURE — 99233 SBSQ HOSP IP/OBS HIGH 50: CPT | Performed by: INTERNAL MEDICINE

## 2024-12-06 PROCEDURE — 71046 X-RAY EXAM CHEST 2 VIEWS: CPT | Performed by: RADIOLOGY

## 2024-12-06 PROCEDURE — 2500000001 HC RX 250 WO HCPCS SELF ADMINISTERED DRUGS (ALT 637 FOR MEDICARE OP): Performed by: CLINICAL NURSE SPECIALIST

## 2024-12-06 PROCEDURE — 94660 CPAP INITIATION&MGMT: CPT

## 2024-12-06 PROCEDURE — 71046 X-RAY EXAM CHEST 2 VIEWS: CPT

## 2024-12-06 PROCEDURE — 2500000001 HC RX 250 WO HCPCS SELF ADMINISTERED DRUGS (ALT 637 FOR MEDICARE OP): Performed by: INTERNAL MEDICINE

## 2024-12-06 PROCEDURE — 2500000002 HC RX 250 W HCPCS SELF ADMINISTERED DRUGS (ALT 637 FOR MEDICARE OP, ALT 636 FOR OP/ED): Performed by: NURSE PRACTITIONER

## 2024-12-06 PROCEDURE — 80069 RENAL FUNCTION PANEL: CPT | Performed by: NURSE PRACTITIONER

## 2024-12-06 PROCEDURE — 85520 HEPARIN ASSAY: CPT | Performed by: INTERNAL MEDICINE

## 2024-12-06 PROCEDURE — 94640 AIRWAY INHALATION TREATMENT: CPT

## 2024-12-06 PROCEDURE — 2500000004 HC RX 250 GENERAL PHARMACY W/ HCPCS (ALT 636 FOR OP/ED): Performed by: NURSE PRACTITIONER

## 2024-12-06 PROCEDURE — 85027 COMPLETE CBC AUTOMATED: CPT | Performed by: INTERNAL MEDICINE

## 2024-12-06 PROCEDURE — S4991 NICOTINE PATCH NONLEGEND: HCPCS | Performed by: NURSE PRACTITIONER

## 2024-12-06 RX ORDER — PAROXETINE HYDROCHLORIDE 20 MG/1
1 TABLET, FILM COATED ORAL DAILY
COMMUNITY
Start: 2015-10-27 | End: 2024-12-13 | Stop reason: WASHOUT

## 2024-12-06 RX ORDER — LISINOPRIL AND HYDROCHLOROTHIAZIDE 10; 12.5 MG/1; MG/1
1 TABLET ORAL DAILY
COMMUNITY
Start: 2015-11-18 | End: 2024-12-06 | Stop reason: HOSPADM

## 2024-12-06 RX ORDER — GABAPENTIN 300 MG/1
300 CAPSULE ORAL
COMMUNITY
Start: 2015-11-02 | End: 2024-12-13 | Stop reason: WASHOUT

## 2024-12-06 RX ORDER — TRAMADOL HYDROCHLORIDE 50 MG/1
50 TABLET ORAL 4 TIMES DAILY
COMMUNITY
Start: 2015-12-29 | End: 2024-12-13 | Stop reason: WASHOUT

## 2024-12-06 RX ORDER — FUROSEMIDE 20 MG/1
20 TABLET ORAL DAILY
Qty: 30 TABLET | Refills: 0 | Status: SHIPPED | OUTPATIENT
Start: 2024-12-07 | End: 2025-01-06

## 2024-12-06 RX ORDER — CARVEDILOL 3.12 MG/1
3.12 TABLET ORAL 2 TIMES DAILY
Qty: 60 TABLET | Refills: 0 | Status: SHIPPED | OUTPATIENT
Start: 2024-12-06 | End: 2025-01-05

## 2024-12-06 RX ORDER — CEFDINIR 300 MG/1
300 CAPSULE ORAL 2 TIMES DAILY
Qty: 6 CAPSULE | Refills: 0 | Status: SHIPPED | OUTPATIENT
Start: 2024-12-06 | End: 2024-12-09

## 2024-12-06 RX ORDER — DOXYCYCLINE 100 MG/1
100 CAPSULE ORAL 2 TIMES DAILY
Qty: 6 CAPSULE | Refills: 0 | Status: SHIPPED | OUTPATIENT
Start: 2024-12-06 | End: 2024-12-09

## 2024-12-06 RX ORDER — LORAZEPAM 0.5 MG/1
0.5 TABLET ORAL
COMMUNITY
Start: 2015-10-27 | End: 2024-12-13 | Stop reason: WASHOUT

## 2024-12-06 RX ORDER — TIZANIDINE 4 MG/1
4 TABLET ORAL
COMMUNITY
Start: 2015-10-27 | End: 2024-12-13 | Stop reason: WASHOUT

## 2024-12-06 RX ORDER — LOVASTATIN 20 MG/1
1 TABLET ORAL NIGHTLY
COMMUNITY
Start: 2015-10-27 | End: 2024-12-13 | Stop reason: WASHOUT

## 2024-12-06 RX ORDER — PREDNISONE 10 MG/1
TABLET ORAL
Qty: 20 TABLET | Refills: 0 | Status: SHIPPED | OUTPATIENT
Start: 2024-12-06 | End: 2024-12-14

## 2024-12-06 RX ORDER — ERGOCALCIFEROL 1.25 MG/1
CAPSULE ORAL
COMMUNITY
Start: 2015-11-20 | End: 2024-12-13 | Stop reason: WASHOUT

## 2024-12-06 ASSESSMENT — PAIN SCALES - GENERAL
PAINLEVEL_OUTOF10: 0 - NO PAIN

## 2024-12-06 ASSESSMENT — COGNITIVE AND FUNCTIONAL STATUS - GENERAL
DAILY ACTIVITIY SCORE: 24
MOBILITY SCORE: 24

## 2024-12-06 ASSESSMENT — PAIN - FUNCTIONAL ASSESSMENT
PAIN_FUNCTIONAL_ASSESSMENT: 0-10

## 2024-12-06 NOTE — DISCHARGE SUMMARY
Discharge Diagnosis  Sepsis (Multi)  NSTEMI  A-fib RVR  Hypertensive emergency  Tobacco dependence  Hyperglycemia  Acute hypoxic respiratory failure  Acute on chronic systolic congestive heart failure  Sepsis due to community-acquired pneumonia  Tobacco dependence  Hyperglycemia    Issues Requiring Follow-Up      Discharge Meds     Medication List      START taking these medications     apixaban 5 mg tablet; Commonly known as: Eliquis; Take 1 tablet (5 mg)   by mouth 2 times a day.   carvedilol 3.125 mg tablet; Commonly known as: Coreg; Take 1 tablet   (3.125 mg) by mouth 2 times a day.   cefdinir 300 mg capsule; Commonly known as: Omnicef; Take 1 capsule (300   mg) by mouth 2 times a day for 3 days.   doxycycline 100 mg capsule; Commonly known as: Vibramycin; Take 1   capsule (100 mg) by mouth 2 times a day for 3 days. Take with at least 8   ounces (large glass) of water, do not lie down for 30 minutes after   empagliflozin 10 mg; Commonly known as: Jardiance; Take 1 tablet (10 mg)   by mouth once daily.; Start taking on: December 7, 2024   furosemide 20 mg tablet; Commonly known as: Lasix; Take 1 tablet (20 mg)   by mouth once daily.; Start taking on: December 7, 2024   sacubitriL-valsartan 24-26 mg tablet; Commonly known as: Entresto; Take   0.5 tablets by mouth 2 times a day.     CONTINUE taking these medications     ergocalciferol 1.25 MG (31205 UT) capsule; Commonly known as: Vitamin   D-2   gabapentin 300 mg capsule; Commonly known as: Neurontin   LORazepam 0.5 mg tablet; Commonly known as: Ativan   lovastatin 20 mg tablet; Commonly known as: Mevacor   PARoxetine 20 mg tablet; Commonly known as: Paxil   tiZANidine 4 mg tablet; Commonly known as: Zanaflex   traMADol 50 mg tablet; Commonly known as: Ultram     STOP taking these medications     lisinopriL-hydrochlorothiazide 10-12.5 mg tablet       Test Results Pending At Discharge  Pending Labs       Order Current Status    Blood Culture Preliminary result     Blood Culture Preliminary result            Hospital Course  Maria D Bourgeois is a 69 y.o. female with no PMHx (she has not seen a provider in 20 yrs and takes no meds)  who presented with shortness of breath since earlier today, along with a cough. She is a smoker but is never been formally diagnosed with COPD. She denied fevers, chest pain, leg edema. Upon arrival she was in respiratory distress and placed on BiPAP. Her systolic blood pressure was initially in the 220s. Point-of-care ultrasound revealed showed multiple B-lines consistent with flash pulmonary edema. She was given multiple doses of sublingual nitroglycerin and placed on a nitroglycerin drip. She required a couple doses of benzodiazepines for anxiety and air hunger. She was found to be in A-fib with RVR and given 5 mg IV metoprolol. She was able to be weaned off the NTG gtt.  Initial VBG showed a respiratory acidosis with improvement on repeat. Remainder of ROS reviewed and negative except as indicated in HPI.      ED Course:  Vitals on presentation: T 38C  RR 47 /120 O2 91%/NRB  Remarkable labs: , Na 132, BNP 2654, troponin 164>224, d-dimer 4570, no CBC as yet  EKG: EKG shows sinus rhythm with multiple PACs. T wave inversions across the anterior lateral leads. No STEMI. Normal intervals and axis   Imaging: CTA chest: Developing left lower lobe consolidation with patchy areas of airspace  disease in the right middle lobe and right lower lobe indicating multilobar pneumonia in the appropriate clinical setting. Cardiomegaly with mild pulmonary edema and trace bilateral pleural effusions suggesting CHF. No definite pulmonary embolus.  Interventions: Blood cultures, ceftriaxone, doxycycline, Solumedrol IV Lasix, Duoneb, NTG gtt  12/4: Patient was seen and examined.  Still requiring 4 L nasal cannula oxygen to maintain saturation.  Echocardiogram shows ejection fraction of 40% with hypokinesia.  Cardiologist on board and plans cardiac  catheterization within the next 24 hours.  A-fib is rate controlled but with intermittent RVR.  Continue IV heparin drip anticoagulation.  Blood cultures are negative x 1  urine Legionella is negative and urine strep antigen is still pending.  Continue current IV antibiotics.  Hypokalemia noted and potassium supplementation given.  WBC trended up.  Will consider adding steroids to breathing treatments in view of patient's extensive smoking history and potential COPD undiagnosed.  Continue to trend CBC and BMP daily  12/5: Patient was seen and examined.  Still requiring nasal cannula oxygen to maintain saturation.  -1100 mL since admission.  Right and left heart catheterization planned for today by cardiologist.  Continue GDMT.  Hypokalemia resolved.  Leukocytosis progressively worsened likely related to steroid therapy.  Continue current antibiotics.  Continue with as needed DuoNebs.  Repeat magnesium, CBC and BMP in a.m.  12/6: Patient was seen and examined.  Now saturating well on room air.  Status post left heart cath yesterday which showed mild coronary artery disease.  Patient has been started on GDMT with Entresto carvedilol and empagliflozin.  Will stop lisinopril and hydrochlorothiazide.  I will add a steroid taper with prednisone.  She was discharged in stable condition to follow-up as outpatient with primary care physician within 1 week of discharge and with cardiologist as scheduled.      The discharge process took about 35 minutes.    Pertinent Physical Exam At Time of Discharge  Physical Exam  Constitutional: Well developed, sedated, calm, no acute distress, cooperative   Eyes: EOMI, clear sclerae   ENMT: mucous membranes moist, no lesions seen   Head/Neck: Neck supple, no apparent injury, head atraumatic   Respiratory/Thorax: CTAB, good chest expansion, respirations even and unlabored, pt on BIPAP   Cardiovascular: Regular rate and rhythm, no murmurs/rubs/gallops, normal S1 and S 2   Gastrointestinal:  Abdomen nondistended, soft, nontender, +BS, no bruits   Musculoskeletal: ROM intact, no joint swelling, normal  strength   Extremities: no cyanosis, edema, contusions or clubbing   Neurological: no focal deficit, pt sedated   Psychological: pt sedated   Skin: Warm and dry, no lesions, no rashes     Outpatient Follow-Up  Future Appointments   Date Time Provider Department Center   12/13/2024  1:00 PM ROE Dickey-CNP [id number] [facility]         Mulugeta De Leon MD

## 2024-12-06 NOTE — PROGRESS NOTES
Patient signed IMM, TCC discussed arranging her PCP appointment for her when discharged and she states she prefers to do that herself but it open to a list of  Providers in the area. Will provide that for her. She has been weaned to room air. She continues to deny needs for home going. ADOD 24 hours. TCC to follow.

## 2024-12-06 NOTE — PROGRESS NOTES
Review of Heart Failure Medications    Patient's most recent LVEF: 40%    I. HFrEF (LVEF <= 40%)  II. HFmrEF (LVEF 41-49%)  III. HFpEF (LVEF >= 50%)    GDMT:    I.   ARNI/ACEI/ARB: Entresto 24-26mg 0.5tab BID  II.  BB: carvedilol 3.125mg BID  III. MRA: Started spironolactone but stopped due to low filling pressures  IV. SGLT2i: Jardiance 10mg QD    Diuretics: Lasix 20mg  Anticoagulation: Eliquis 5mg BID    Patient is new to all of these medications. We discussed how they work and what to expect. We also talked about the importance of her heart failure medications. Unfortunately price will be a challenge given the medications are costly. The jardiance alone is around $600/month. She will be eligible for a free trial card for the eliquis and entresto for 1 month which is what she will be discharged with. She will be continuing her care with [facility]. We discussed upon discharge to discuss with her care team at the [facility] if there are alterative ways to help her afford her medications.    Layne Lizama, PharmD

## 2024-12-06 NOTE — CARE PLAN
The patient's goals for the shift include      The clinical goals for the shift include pt. will be hemodynamically stable throughout shift      Problem: Fall/Injury  Goal: Not fall by end of shift  Outcome: Progressing  Goal: Be free from injury by end of the shift  Outcome: Progressing  Goal: Verbalize understanding of personal risk factors for fall in the hospital  Outcome: Progressing  Goal: Verbalize understanding of risk factor reduction measures to prevent injury from fall in the home  Outcome: Progressing  Goal: Use assistive devices by end of the shift  Outcome: Progressing  Goal: Pace activities to prevent fatigue by end of the shift  Outcome: Progressing     Problem: Pain  Goal: Takes deep breaths with improved pain control throughout the shift  Outcome: Progressing  Goal: Turns in bed with improved pain control throughout the shift  Outcome: Progressing  Goal: Walks with improved pain control throughout the shift  Outcome: Progressing  Goal: Performs ADL's with improved pain control throughout shift  Outcome: Progressing  Goal: Participates in PT with improved pain control throughout the shift  Outcome: Progressing  Goal: Free from opioid side effects throughout the shift  Outcome: Progressing  Goal: Free from acute confusion related to pain meds throughout the shift  Outcome: Progressing     Problem: Skin  Goal: Decreased wound size/increased tissue granulation at next dressing change  Outcome: Progressing  Goal: Participates in plan/prevention/treatment measures  Outcome: Progressing  Goal: Prevent/manage excess moisture  Outcome: Progressing  Goal: Prevent/minimize sheer/friction injuries  Outcome: Progressing  Goal: Promote/optimize nutrition  Outcome: Progressing  Goal: Promote skin healing  Outcome: Progressing

## 2024-12-06 NOTE — PROGRESS NOTES
Subjective Data:  Today, Ms. Bourgeois is sitting up in bed, reports that she is feeling significantly improved, breathing easier. Denies chest pain or pressure, no dizziness or lightheadedness. TTE findings with new LV dysfunction, EF 40%. Discussed TTE findings with patient and need for additional workup. Monitor demonstrates SR with frequent PAC, PVCs.  12-6-24: No CP/pressure, dyspnea is improving.  + cough. Had L/RHC yesterday: mild nonobst. CAD, normal LV filling, normal CO.  Monitor: SR    Overnight Events:    None     Objective Data:  Last Recorded Vitals:  Vitals:    12/05/24 2317 12/06/24 0430 12/06/24 0721 12/06/24 0727   BP: 129/71 119/73  127/90   BP Location: Left arm Right arm  Right arm   Patient Position: Lying Lying  Lying   Pulse: 65 61  70   Resp: 18 18  18   Temp: 36.2 °C (97.2 °F) 35.8 °C (96.5 °F)  35.8 °C (96.5 °F)   TempSrc: Temporal Temporal  Temporal   SpO2: 96% 96% 94% 94%   Weight:  55.1 kg (121 lb 7.6 oz)     Height:           Last Labs:  Results from last 7 days   Lab Units 12/06/24  0423 12/05/24  0355 12/04/24  0259   SODIUM mmol/L 138 138 136   POTASSIUM mmol/L 4.3 3.7 3.3*   CHLORIDE mmol/L 105 103 102   CO2 mmol/L 24 29 25   BUN mg/dL 29* 40* 28*   CREATININE mg/dL 0.67 0.72 0.85   GLUCOSE mg/dL 98 108* 129*   CALCIUM mg/dL 8.6 8.6 8.2*     Results from last 7 days   Lab Units 12/06/24  0423   WBC AUTO x10*3/uL 12.5*   HEMOGLOBIN g/dL 13.8   HEMATOCRIT % 43.5   PLATELETS AUTO x10*3/uL 369         TROPHS   Date/Time Value Ref Range Status   12/03/2024 08:30  0 - 13 ng/L Final     Comment:     Previous result verified on 12/3/2024 0356 on specimen/case [id number] called with component [facility] for procedure Troponin I, High Sensitivity with value 164 ng/L.   12/03/2024 05:35  0 - 13 ng/L Final     Comment:     Previous result verified on 12/3/2024 0356 on specimen/case [id number] called with component [facility] for procedure Troponin I, High Sensitivity with value 164  ng/L.   12/03/2024 03:20  0 - 13 ng/L Final     BNP   Date/Time Value Ref Range Status   12/03/2024 03:20 AM 2,654 0 - 99 pg/mL Final     HGBA1C   Date/Time Value Ref Range Status   12/03/2024 08:30 AM 6.3 See comment % Final     LDLCALC   Date/Time Value Ref Range Status   12/03/2024 05:35 AM 61 <=99 mg/dL Final     Comment:                                 Near   Borderline      AGE      Desirable  Optimal    High     High     Very High     0-19 Y     0 - 109     ---    110-129   >/= 130     ----    20-24 Y     0 - 119     ---    120-159   >/= 160     ----      >24 Y     0 -  99   100-129  130-159   160-189     >/=190       VLDL   Date/Time Value Ref Range Status   12/03/2024 05:35 AM 11 0 - 40 mg/dL Final      Last I/O:  I/O last 3 completed shifts:  In: 1611.9 (29.3 mL/kg) [I.V.:911.9 (16.5 mL/kg); IV Piggyback:700]  Out: 2010 (36.5 mL/kg) [Urine:2000 (1 mL/kg/hr); Blood:10]  Weight: 55.1 kg     Past Cardiology Tests (Last 3 Years):  EKG:  No results found for this or any previous visit from the past 1095 days.    Echo:  Transthoracic Echo (TTE) Complete 12/03/2024  1. The left ventricular systolic function is moderately decreased, with a Guerrero's biplane calculated ejection fraction of 40%.   2. There is global hypokinesis of the left ventricle with minor regional variations.   3. Left ventricular diastolic filling was indeterminate.   4. There is moderately reduced right ventricular systolic function.   5. Aortic valve stenosis is not present.   6. Mild aortic valve regurgitation.   7. Lipomatous hypertrophy of the atrial septum.   8. There is moderately increased septal and severely increased posterior left ventricular wall thickness.     Cath:  CONCLUSIONS:   1. Mild non-obstructive coronary artery disease.   2. Normal right heart pressures.   3. Left Ventricular end-diastolic pressure = 9.   4. Normal LV filling pressures.       Inpatient Medications:  Scheduled medications   Medication Dose Route  Frequency    acetaminophen  975 mg oral Once    apixaban  5 mg oral BID    carvedilol  3.125 mg oral BID    cefTRIAXone  1 g intravenous q24h    doxycycline  100 mg intravenous q12h    furosemide  20 mg oral Daily    insulin lispro  0-5 Units subcutaneous TID AC    ipratropium-albuteroL  3 mL nebulization TID    methylPREDNISolone sodium succinate (PF)  40 mg intravenous q24h    nicotine  1 patch transdermal Daily    perflutren lipid microspheres  0.5-10 mL of dilution intravenous Once in imaging    potassium chloride CR  40 mEq oral BID    sacubitriL-valsartan  0.5 tablet oral BID    sennosides  2 tablet oral BID     PRN medications   Medication    acetaminophen    dextrose    dextrose    glucagon    glucagon    melatonin    nitroglycerin    oxygen     Continuous Medications   Medication Dose Last Rate    nitroglycerin  5-200 mcg/min Stopped (12/03/24 4917)       Physical Exam:  Physical Exam  Vitals reviewed.   Constitutional:       Appearance: Normal appearance.   HENT:      Head: Normocephalic.      Mouth/Throat:      Mouth: Mucous membranes are moist.   Cardiovascular:      Rate and Rhythm: Normal rate. Rhythm regularly irregular.      Pulses: Normal pulses.      Heart sounds: Normal heart sounds. No murmur heard.     No friction rub. No gallop.   Pulmonary:      Effort: Pulmonary effort is normal.      Breath sounds: No wheezing, rhonchi or rales (Bibasilar).      Comments: Diminished bases bilaterally, prolonged expiratory phase.   Abdominal:      General: Bowel sounds are normal.      Palpations: Abdomen is soft.   Musculoskeletal:         General: Normal range of motion.      Cervical back: Normal range of motion.      Comments: R radial site looks good, no ecchymosis, good radial pulse.   Skin:     General: Skin is warm and dry.   Neurological:      General: No focal deficit present.      Mental Status: She is alert and oriented to person, place, and time.   Psychiatric:         Mood and Affect: Mood  "normal.         Behavior: Behavior normal.         Thought Content: Thought content normal.         Judgment: Judgment normal.           Assessment/Plan   1) Acute systolic and diastolic heart failure  IV diuresis  Echo  12/4/24: TTE with reduced LVEF 40%, moderately reduced right ventricular systolic function, moderately increased septal and severely increased posterior left ventricular wall thickness. Discussed results of TTE results with patient and she reports that she has significant family history of \"genetic heart failure\" with her mother dying in her 50s after heart transplant and maternal grandfather passing away \"at young age\" from hear failure.    Patient remains hypervolemic on exam with continued need for IV diuresis, possible transition to oral tomorrow.   - HF GDMT: Beta blocker: Carvedilol 3.125 mg BID  ACE inhibitor/ARB/ARNI: Initiate Sacubitril-valsartan 24-26 mg 0.5 tablet BID  MRA: Initiate spironolactone 25 mg daily  SGLT2i:   Start after L/RHC   Diuretic: Continue on Furosemide 40 mg BID  Hydralazine/Nitrate: None    -Plan for L/RHC with Dr. Cadena tomorrow, 12/5 if patient's respiratory status continues to improve. NPO after midnight.   12-6-24: Breathing is improving, still with cough. On tx for PNA.  RHC with normal filling pressures/normal CO, LHC with mild CAD.  Medical tx. Continue on Carvedilol, oral Lasix, and Entresto. Will add Jardiance.    2) Afib  Now in NSR  Will start IV Heparin gtt with eventual transition to anticoagulation    12/4/24: Patient remains in SA, with frequent PAC, PVCs. Maintain on Heparin infusion in anticipation of L/RHC tentatively scheduled for tomorrow.    12-6-24: SR on tele with PACs.  Continue on Eliquis and Carvedilol.      DISPOSITION:  Add Jardiance 10 mg daily  Continue on current cardiac meds  Will arrange outpt follow up as will likely go home today/tomorrow       Code Status:  Full Code    Reyna Calvo, APRN-CNP  "

## 2024-12-07 LAB
BACTERIA BLD CULT: NORMAL
BACTERIA BLD CULT: NORMAL

## 2024-12-08 NOTE — PROGRESS NOTES
Chief Complaint/Reason for Visit:   Hospital follow up      History Of Present Illness:    Maria D Bourgeois is a 69 y.o. female with no PMHx (she has not seen a provider in 20 yrs and takes no meds)  who presented with shortness of breath since earlier today, along with a cough. She is a smoker but is never been formally diagnosed with COPD. She denied fevers, chest pain, leg edema. Upon arrival she was in respiratory distress and placed on BiPAP. Her systolic blood pressure was initially in the 220s. Point-of-care ultrasound revealed showed multiple B-lines consistent with flash pulmonary edema. She was given multiple doses of sublingual nitroglycerin and placed on a nitroglycerin drip. She required a couple doses of benzodiazepines for anxiety and air hunger. She was found to be in A-fib with RVR and given 5 mg IV metoprolol. She was able to be weaned off the NTG gtt.  Initial VBG showed a respiratory acidosis with improvement on repeat. Remainder of ROS reviewed and negative except as indicated in HPI.        Past Medical History:  She has a past medical history of Atrial fibrillation (Multi), CAD (coronary artery disease), and HFrEF (heart failure with reduced ejection fraction).    Past Surgical History:  She has a past surgical history that includes Other surgical history; Other surgical history; and  section, classic.      Social History:  She reports that she has been smoking cigarettes. She started smoking about 11 months ago. She has a 0.9 pack-year smoking history. She has never used smokeless tobacco. She reports that she does not drink alcohol and does not use drugs.    Family History:  Family History   Problem Relation Name Age of Onset    Heart disease Mother          Allergies:  Patient has no known allergies.    Medications:  Current Outpatient Medications   Medication Instructions    apixaban (ELIQUIS) 5 mg, oral, 2 times daily    carbamide peroxide (Debrox) 6.5 % otic solution 5 drops, Left  Ear, 2 times daily    carvedilol (COREG) 3.125 mg, oral, 2 times daily    cefdinir (OMNICEF) 300 mg, oral, 2 times daily    doxycycline (VIBRAMYCIN) 100 mg, oral, 2 times daily, Take with at least 8 ounces (large glass) of water, do not lie down for 30 minutes after    empagliflozin (JARDIANCE) 10 mg, oral, Daily    ergocalciferol (Vitamin D-2) 1.25 MG (39223 UT) capsule Take by mouth.    furosemide (LASIX) 20 mg, oral, Daily    gabapentin (NEURONTIN) 300 mg    LORazepam (ATIVAN) 0.5 mg    lovastatin (Mevacor) 20 mg tablet 1 tablet, Nightly    PARoxetine (Paxil) 20 mg tablet 1 tablet, Daily    predniSONE (Deltasone) 10 mg tablet Take 4 tablets (40 mg) by mouth once daily for 2 days, THEN 3 tablets (30 mg) once daily for 2 days, THEN 2 tablets (20 mg) once daily for 2 days, THEN 1 tablet (10 mg) once daily for 2 days.    sacubitriL-valsartan (Entresto) 24-26 mg tablet 0.5 tablets, oral, 2 times daily    tiZANidine (ZANAFLEX) 4 mg    traMADol (ULTRAM) 50 mg, 4 times daily       Review of Systems:  ROS     Vitals  Visit Vitals  OB Status Postmenopausal   Smoking Status Every Day        Physical Exam:  Physical Exam      Last Labs:  CBC -  Lab Results   Component Value Date    WBC 12.5 (H) 12/06/2024    HGB 13.8 12/06/2024    HCT 43.5 12/06/2024    MCV 97 12/06/2024     12/06/2024     Lab Results   Component Value Date    GLUCOSE 98 12/06/2024    CALCIUM 8.6 12/06/2024     12/06/2024    K 4.3 12/06/2024    CO2 24 12/06/2024     12/06/2024    BUN 29 (H) 12/06/2024    CREATININE 0.67 12/06/2024      CMP -  Lab Results   Component Value Date    CALCIUM 8.6 12/06/2024    PHOS 2.6 12/06/2024    PROT 7.6 12/03/2024    ALBUMIN 3.3 (L) 12/06/2024    AST 20 12/03/2024    ALT 13 12/03/2024    ALKPHOS 73 12/03/2024    BILITOT 0.3 12/03/2024       LIPID PANEL -   Lab Results   Component Value Date    CHOL 117 12/03/2024    TRIG 57 12/03/2024    HDL 44.7 12/03/2024    CHHDL 2.6 12/03/2024    VLDL 11 12/03/2024     "ZENY 72 12/03/2024       Lab Results   Component Value Date    BNP 2,654 (H) 12/03/2024    HGBA1C 6.3 (H) 12/03/2024       Last Cardiology Tests:  ECG:    Echo:12-3-24  CONCLUSIONS:   1. The left ventricular systolic function is moderately decreased, with a Guerrero's biplane calculated ejection fraction of 40%.   2. There is global hypokinesis of the left ventricle with minor regional variations.   3. Left ventricular diastolic filling was indeterminate.   4. There is moderately reduced right ventricular systolic function.   5. Aortic valve stenosis is not present.   6. Mild aortic valve regurgitation.   7. Lipomatous hypertrophy of the atrial septum.   8. There is moderately increased septal and severely increased posterior left ventricular wall thickness.    Cath:12-5-24  CONCLUSIONS:   1. Mild non-obstructive coronary artery disease.   2. Normal right heart pressures.   3. Left Ventricular end-diastolic pressure = 9.   4. Normal LV filling pressures.      {Lab/Diag/Rad Review:73669}    Assessment/Plan:  1) Acute systolic and diastolic heart failure  IV diuresis  Echo  12/4/24: TTE with reduced LVEF 40%, moderately reduced right ventricular systolic function, moderately increased septal and severely increased posterior left ventricular wall thickness. Discussed results of TTE results with patient and she reports that she has significant family history of \"genetic heart failure\" with her mother dying in her 50s after heart transplant and maternal grandfather passing away \"at young age\" from hear failure.    Patient remains hypervolemic on exam with continued need for IV diuresis, possible transition to oral tomorrow.   - HF GDMT: Beta blocker: Carvedilol 3.125 mg BID  ACE inhibitor/ARB/ARNI: Initiate Sacubitril-valsartan 24-26 mg 0.5 tablet BID  MRA: Initiate spironolactone 25 mg daily  SGLT2i:   Start after L/RHC   Diuretic: Continue on Furosemide 40 mg BID  Hydralazine/Nitrate: None     -Plan for L/RHC with Dr." Surinder tomorrow, 12/5 if patient's respiratory status continues to improve. NPO after midnight.   12-6-24: Breathing is improving, still with cough. On tx for PNA.  RHC with normal filling pressures/normal CO, LHC with mild CAD.  Medical tx. Continue on Carvedilol, oral Lasix, and Entresto. Will add Jardiance.     2) Afib  Now in NSR  Will start IV Heparin gtt with eventual transition to anticoagulation     12/4/24: Patient remains in SA, with frequent PAC, PVCs. Maintain on Heparin infusion in anticipation of L/RHC tentatively scheduled for tomorrow.    12-6-24: SR on tele with PACs.  Continue on Eliquis and Carvedilol.       DISPOSITION:  Add Jardiance 10 mg daily  Continue on current cardiac meds  Will arrange outpt follow up as will likely go home today/tomorrow       Reyna Calvo, ROE-CNP

## 2024-12-13 ENCOUNTER — OFFICE VISIT (OUTPATIENT)
Dept: CARDIOLOGY | Facility: HOSPITAL | Age: 69
End: 2024-12-13
Payer: COMMERCIAL

## 2024-12-13 ENCOUNTER — APPOINTMENT (OUTPATIENT)
Dept: CARDIOLOGY | Facility: HOSPITAL | Age: 69
End: 2024-12-13
Payer: COMMERCIAL

## 2024-12-13 VITALS
HEIGHT: 60 IN | DIASTOLIC BLOOD PRESSURE: 78 MMHG | HEART RATE: 65 BPM | BODY MASS INDEX: 22.58 KG/M2 | SYSTOLIC BLOOD PRESSURE: 132 MMHG | OXYGEN SATURATION: 100 % | WEIGHT: 115 LBS

## 2024-12-13 DIAGNOSIS — I10 PRIMARY HYPERTENSION: Primary | ICD-10-CM

## 2024-12-13 DIAGNOSIS — I50.41 ACUTE COMBINED SYSTOLIC AND DIASTOLIC HEART FAILURE: ICD-10-CM

## 2024-12-13 DIAGNOSIS — I48.0 PAROXYSMAL ATRIAL FIBRILLATION (MULTI): ICD-10-CM

## 2024-12-13 DIAGNOSIS — F17.200 TOBACCO USE DISORDER: ICD-10-CM

## 2024-12-13 LAB
Q ONSET: 220 MS
QRS COUNT: 11 BEATS
QRS DURATION: 108 MS
QT INTERVAL: 396 MS
QTC CALCULATION(BAZETT): 424 MS
QTC FREDERICIA: 415 MS
R AXIS: 117 DEGREES
T AXIS: 159 DEGREES
T OFFSET: 418 MS
VENTRICULAR RATE: 69 BPM

## 2024-12-13 PROCEDURE — 93005 ELECTROCARDIOGRAM TRACING: CPT | Performed by: NURSE PRACTITIONER

## 2024-12-13 PROCEDURE — 99214 OFFICE O/P EST MOD 30 MIN: CPT | Mod: 25 | Performed by: NURSE PRACTITIONER

## 2024-12-13 PROCEDURE — 1159F MED LIST DOCD IN RCRD: CPT | Performed by: NURSE PRACTITIONER

## 2024-12-13 PROCEDURE — 1160F RVW MEDS BY RX/DR IN RCRD: CPT | Performed by: NURSE PRACTITIONER

## 2024-12-13 PROCEDURE — 3078F DIAST BP <80 MM HG: CPT | Performed by: NURSE PRACTITIONER

## 2024-12-13 PROCEDURE — 1111F DSCHRG MED/CURRENT MED MERGE: CPT | Performed by: NURSE PRACTITIONER

## 2024-12-13 PROCEDURE — 3075F SYST BP GE 130 - 139MM HG: CPT | Performed by: NURSE PRACTITIONER

## 2024-12-13 PROCEDURE — 99214 OFFICE O/P EST MOD 30 MIN: CPT | Performed by: NURSE PRACTITIONER

## 2024-12-13 PROCEDURE — 3008F BODY MASS INDEX DOCD: CPT | Performed by: NURSE PRACTITIONER

## 2024-12-13 ASSESSMENT — ENCOUNTER SYMPTOMS
BLURRED VISION: 0
SYNCOPE: 0
GASTROINTESTINAL NEGATIVE: 1
FOCAL WEAKNESS: 0
DYSPNEA ON EXERTION: 1
DECREASED APPETITE: 0
SHORTNESS OF BREATH: 0
DEPRESSION: 0
MEMORY LOSS: 0
COUGH: 0
IRREGULAR HEARTBEAT: 0
LIGHT-HEADEDNESS: 1
PALPITATIONS: 0
ORTHOPNEA: 0
RESPIRATORY NEGATIVE: 1
FALLS: 0

## 2024-12-13 NOTE — PATIENT INSTRUCTIONS
Continue on current meds  Heart healthy, low sodium diet  Mediterranean diet is recommended  CMP and CBC after Shahana  Work on quitting smoking  You need to establish with a PCP  Follow up with Dr Cadena in 3 months

## 2024-12-13 NOTE — PROGRESS NOTES
Chief Complaint/Reason for Visit:   Hospital follow up, new to office.      History Of Present Illness:    Ms. Bourgeois is coming today as a hospital follow-up.  Patient was seen at [facility] from December 3 through  after presenting with shortness of breath along with a cough.  She admitted to not seeing any physicians in the past 20 years and was taking no medication.  Patient's blood pressure was significantly elevated, she appeared to have flash pulmonary edema.  She was also noted to be in atrial fibrillation.  Echocardiogram showed an ejection fraction of 40%, moderately reduced right ventricular systolic function, mild AI, lipomatous hypertrophy of the atrial septum.  Heart catheterization showed mild nonobstructive coronary artery disease right heart cath showed normal right heart pressures, LVEDP was 9.  Patient's atrial fibrillation converted spontaneously.  On day of discharge she was sinus rhythm with frequent PACs.  A-fib was treated with carvedilol and Eliquis.    Past medical history significant for smoking, chronic back pain, OA.  She has not gone to doctors for the past 20 years.    Patient comes in today feeling well other than some ongoing fatigue.  She denies any chest pain, pressure, palpitations, orthopnea, or edema.  She has some mild dyspnea on exertion with moderate activity which is not new.  She has been taking all of her medication as prescribed except for Jardiance which she was unable to afford.  I will refer this medication to our clinical pharmacology team.    Past Medical History:  She has a past medical history of Atrial fibrillation (Multi), CAD (coronary artery disease), and HFrEF (heart failure with reduced ejection fraction).    Past Surgical History:  She has a past surgical history that includes Other surgical history; Other surgical history;  section, classic; and Cardiac catheterization (N/A, 2024).      Social History:  She reports that  she has been smoking cigarettes. She started smoking about a year ago. She has a 0.9 pack-year smoking history. She has never used smokeless tobacco. She reports that she does not drink alcohol and does not use drugs.    Family History:  Family History   Problem Relation Name Age of Onset    Heart disease Mother          Allergies:  Patient has no known allergies.    Medications:  Current Outpatient Medications   Medication Instructions    apixaban (ELIQUIS) 5 mg, oral, 2 times daily    carbamide peroxide (Debrox) 6.5 % otic solution 5 drops, Left Ear, 2 times daily    carvedilol (COREG) 3.125 mg, oral, 2 times daily    empagliflozin (JARDIANCE) 10 mg, oral, Daily    furosemide (LASIX) 20 mg, oral, Daily    sacubitriL-valsartan (Entresto) 24-26 mg tablet 0.5 tablets, oral, 2 times daily       Review of Systems:  Review of Systems   Constitutional: Positive for malaise/fatigue. Negative for decreased appetite.   HENT: Negative.     Eyes:  Negative for blurred vision and visual disturbance.   Cardiovascular:  Positive for dyspnea on exertion (with moderate activity). Negative for chest pain, irregular heartbeat, leg swelling, orthopnea, palpitations and syncope.   Respiratory: Negative.  Negative for cough and shortness of breath.         Working on quitting smoking   Musculoskeletal:  Positive for arthritis (L knee with OA). Negative for falls.   Gastrointestinal: Negative.    Neurological:  Positive for light-headedness (occasional). Negative for focal weakness.   Psychiatric/Behavioral:  Negative for depression and memory loss.         Vitals  Visit Vitals  /78   Pulse 65   Ht 1.524 m (5')   Wt 52.2 kg (115 lb)   SpO2 100%   BMI 22.46 kg/m²   OB Status Postmenopausal   Smoking Status Every Day   BSA 1.49 m²        Physical Exam:  Physical Exam  Constitutional:       Appearance: Normal appearance.   HENT:      Head: Normocephalic.   Eyes:      Conjunctiva/sclera: Conjunctivae normal.   Cardiovascular:       Rate and Rhythm: Normal rate and regular rhythm.      Pulses: Normal pulses.      Heart sounds: S1 normal and S2 normal. No murmur heard.     No friction rub. No gallop.   Pulmonary:      Effort: Pulmonary effort is normal.      Breath sounds: Normal breath sounds.      Comments: Prolonged expiratory phase  Abdominal:      General: Bowel sounds are normal.      Palpations: Abdomen is soft.      Tenderness: There is no abdominal tenderness.   Musculoskeletal:      Cervical back: Neck supple.      Right lower leg: No edema.      Left lower leg: No edema.      Comments: Radial site with good pulse, fading ecchymosis   Skin:     General: Skin is warm and dry.   Neurological:      General: No focal deficit present.      Mental Status: She is alert and oriented to person, place, and time.   Psychiatric:         Attention and Perception: Attention normal.         Mood and Affect: Mood normal.         Last Labs:  CBC -  Lab Results   Component Value Date    WBC 12.5 (H) 12/06/2024    HGB 13.8 12/06/2024    HCT 43.5 12/06/2024    MCV 97 12/06/2024     12/06/2024     Lab Results   Component Value Date    GLUCOSE 98 12/06/2024    CALCIUM 8.6 12/06/2024     12/06/2024    K 4.3 12/06/2024    CO2 24 12/06/2024     12/06/2024    BUN 29 (H) 12/06/2024    CREATININE 0.67 12/06/2024      CMP -  Lab Results   Component Value Date    CALCIUM 8.6 12/06/2024    PHOS 2.6 12/06/2024    PROT 7.6 12/03/2024    ALBUMIN 3.3 (L) 12/06/2024    AST 20 12/03/2024    ALT 13 12/03/2024    ALKPHOS 73 12/03/2024    BILITOT 0.3 12/03/2024       LIPID PANEL -   Lab Results   Component Value Date    CHOL 117 12/03/2024    TRIG 57 12/03/2024    HDL 44.7 12/03/2024    CHHDL 2.6 12/03/2024    VLDL 11 12/03/2024    NHDL 72 12/03/2024       Lab Results   Component Value Date    BNP 2,654 (H) 12/03/2024    HGBA1C 6.3 (H) 12/03/2024       Last Cardiology Tests:  ECG:  EKG done in the office today is what appears to be sinus rhythm with sinus  arrhythmia, frequent PACs, heart rate 88 bpm QT corrected interval 471 ms.  T wave abnormalities are noted.  This will go to Dr. Cadena for final review.    Echo:12-3-24  CONCLUSIONS:   1. The left ventricular systolic function is moderately decreased, with a Guerrero's biplane calculated ejection fraction of 40%.   2. There is global hypokinesis of the left ventricle with minor regional variations.   3. Left ventricular diastolic filling was indeterminate.   4. There is moderately reduced right ventricular systolic function.   5. Aortic valve stenosis is not present.   6. Mild aortic valve regurgitation.   7. Lipomatous hypertrophy of the atrial septum.   8. There is moderately increased septal and severely increased posterior left ventricular wall thickness.    Cath:12-5-24  CONCLUSIONS:   1. Mild non-obstructive coronary artery disease.   2. Normal right heart pressures.   3. Left Ventricular end-diastolic pressure = 9.   4. Normal LV filling pressures.      Lab review: I have personally reviewed the laboratory result(s)     Assessment/Plan:  Combined systolic/diastolic heart failure: Patient presented with atrial fibrillation, hypertension, and pulmonary edema.  She was started on carvedilol, Entresto, and furosemide.  She also was treated for  pneumonia.  Heart catheterization showed mild coronary artery disease and right heart cath showed normal filling pressures.  Prior to discharge she was started on Jardiance but patient couldn't afford. Referred to clinical pharmacology to help with Jardiance coverage. Patient appears to be well compensated on exam.  I recommended she get updated labs in the next 2 to 3 weeks.  She should be on a heart healthy low-sodium diet.    Hypertension: Blood pressure today shows fair control at 132/78.  She will continue on Entresto 24/26 mg tablets half tablet twice daily, furosemide 20 mg daily, and carvedilol 3.125 mg twice daily.    Paroxysmal atrial fibrillation: Patient  spontaneously converted to sinus rhythm during her hospitalization.  She was treated with carvedilol for rate control.  Patient had an elevated [id number] score requiring anticoagulation.  She is not having any significant bleeding or bruising and will continue on Eliquis 5 mg twice daily.  Patient will monitor for any new bleeding.    Tobacco use disorder: Patient is trying to work on smoking cessation.  I encouraged her to do so and congratulated her on cutting back.  She understands she needs to work towards total cessation.    Patient will be scheduled to follow-up with Dr. Cadena in 3 months.  Patient instructed to call with any cardiovascular complaints. All questions were answered.       Dragon dictation was utilized to create this document. Quite often unanticipated grammatical, syntax,  and other interpretive errors are inadvertently transcribed by the computer software.  Please disregard these errors.  Please excuse any errors that have escaped final proofreading.             Reyna Calvo, APRN-CNP

## 2024-12-18 LAB
ATRIAL RATE: 74 BPM
Q ONSET: 222 MS
QRS COUNT: 14 BEATS
QRS DURATION: 86 MS
QT INTERVAL: 390 MS
QTC CALCULATION(BAZETT): 471 MS
QTC FREDERICIA: 443 MS
R AXIS: -14 DEGREES
T AXIS: 96 DEGREES
T OFFSET: 417 MS
VENTRICULAR RATE: 88 BPM

## 2024-12-23 ENCOUNTER — TELEPHONE (OUTPATIENT)
Dept: CARDIOLOGY | Facility: HOSPITAL | Age: 69
End: 2024-12-23
Payer: COMMERCIAL

## 2024-12-23 DIAGNOSIS — I48.0 PAROXYSMAL ATRIAL FIBRILLATION (MULTI): Primary | ICD-10-CM

## 2024-12-23 NOTE — TELEPHONE ENCOUNTER
Patient called and left voicemail , she stated that jardiance is too expensive and wanting to know next steps , requesting call back

## 2025-01-07 DIAGNOSIS — I50.23 ACUTE ON CHRONIC SYSTOLIC (CONGESTIVE) HEART FAILURE: ICD-10-CM

## 2025-01-07 RX ORDER — SACUBITRIL AND VALSARTAN 24; 26 MG/1; MG/1
0.5 TABLET, FILM COATED ORAL 2 TIMES DAILY
Qty: 30 TABLET | Refills: 0 | Status: SHIPPED | OUTPATIENT
Start: 2025-01-07 | End: 2025-02-06

## 2025-01-07 RX ORDER — FUROSEMIDE 20 MG/1
20 TABLET ORAL DAILY
Qty: 30 TABLET | Refills: 0 | Status: SHIPPED | OUTPATIENT
Start: 2025-01-07 | End: 2025-02-06

## 2025-01-07 RX ORDER — CARVEDILOL 3.12 MG/1
3.12 TABLET ORAL 2 TIMES DAILY
Qty: 60 TABLET | Refills: 0 | Status: SHIPPED | OUTPATIENT
Start: 2025-01-07 | End: 2025-02-06

## 2025-01-07 NOTE — TELEPHONE ENCOUNTER
Attempted to call patient to follow up on Clinical Pharmacy referral which could possibly help with medication costs.  Her only phone number is disconnected.  Message to staff asking to send her a letter to call the office to update her phone number and give her the phone number for Clinical Pharmacy.

## 2025-01-07 NOTE — TELEPHONE ENCOUNTER
----- Message from Nurse Nella HUNG sent at 1/6/2025  5:02 PM EST -----  Regarding: Refill  Patient Furosemide, Entresto 24/26 (1/2 tab twice a day), Eliquis 5 mg, carvedilol 3.125 mg to be sent to [facility] in [place]. She also states that she was supposed to leave the hospital with Jardiance but did not. She was told she could get 30 days for free but cannot afford it after that. It will cost $572. She is out of all of her meds.

## 2025-01-31 DIAGNOSIS — I50.23 ACUTE ON CHRONIC SYSTOLIC (CONGESTIVE) HEART FAILURE: ICD-10-CM

## 2025-01-31 NOTE — TELEPHONE ENCOUNTER
Attempted to reach patient via home phone number to remind her to complete lab work that was ordered in December. Phone number is not in service.     Please send letter to patient to obtain accurate contact information and reminder to complete labs.

## 2025-02-01 RX ORDER — CARVEDILOL 3.12 MG/1
3.12 TABLET ORAL 2 TIMES DAILY
Qty: 60 TABLET | Refills: 0 | Status: SHIPPED | OUTPATIENT
Start: 2025-02-01 | End: 2025-03-03

## 2025-02-01 RX ORDER — SACUBITRIL AND VALSARTAN 24; 26 MG/1; MG/1
0.5 TABLET, FILM COATED ORAL 2 TIMES DAILY
Qty: 30 TABLET | Refills: 0 | Status: SHIPPED | OUTPATIENT
Start: 2025-02-01 | End: 2025-03-03

## 2025-02-01 RX ORDER — FUROSEMIDE 20 MG/1
20 TABLET ORAL DAILY
Qty: 30 TABLET | Refills: 0 | Status: SHIPPED | OUTPATIENT
Start: 2025-02-01 | End: 2025-03-03

## 2025-03-04 DIAGNOSIS — I50.23 ACUTE ON CHRONIC SYSTOLIC (CONGESTIVE) HEART FAILURE: ICD-10-CM

## 2025-03-04 RX ORDER — SACUBITRIL AND VALSARTAN 24; 26 MG/1; MG/1
0.5 TABLET, FILM COATED ORAL 2 TIMES DAILY
Qty: 90 TABLET | Refills: 1 | Status: SHIPPED | OUTPATIENT
Start: 2025-03-04

## 2025-03-04 RX ORDER — FUROSEMIDE 20 MG/1
20 TABLET ORAL DAILY
Qty: 90 TABLET | Refills: 1 | Status: SHIPPED | OUTPATIENT
Start: 2025-03-04

## 2025-03-04 RX ORDER — CARVEDILOL 3.12 MG/1
3.12 TABLET ORAL 2 TIMES DAILY
Qty: 180 TABLET | Refills: 3 | Status: SHIPPED | OUTPATIENT
Start: 2025-03-04 | End: 2026-03-04

## 2025-03-04 NOTE — TELEPHONE ENCOUNTER
----- Message from Nurse Nella HUNG sent at 3/3/2025  5:34 PM EST -----  Regarding: Refill  Patient is requesting a refill of Entresto 24/26, carvedilol 3.125, Eliquis 5 mg, and furosemide 20 mg to be sent to [facility] in [place].

## 2025-03-12 PROBLEM — I50.40 COMBINED SYSTOLIC AND DIASTOLIC HEART FAILURE: Status: ACTIVE | Noted: 2025-03-12

## 2025-03-12 PROBLEM — I50.42 CHRONIC COMBINED SYSTOLIC AND DIASTOLIC HEART FAILURE: Status: RESOLVED | Noted: 2025-03-12 | Resolved: 2025-03-12

## 2025-03-12 PROBLEM — J43.9 EMPHYSEMA LUNG (MULTI): Status: ACTIVE | Noted: 2025-03-12

## 2025-03-12 PROBLEM — I50.42 CHRONIC COMBINED SYSTOLIC AND DIASTOLIC HEART FAILURE: Status: ACTIVE | Noted: 2025-03-12

## 2025-03-12 PROBLEM — F17.200 NICOTINE DEPENDENCE: Status: ACTIVE | Noted: 2025-03-12

## 2025-03-12 PROBLEM — I48.0 PAROXYSMAL ATRIAL FIBRILLATION (MULTI): Status: ACTIVE | Noted: 2025-03-12

## 2025-03-12 LAB
ALBUMIN SERPL-MCNC: 4.1 G/DL (ref 3.6–5.1)
ALP SERPL-CCNC: 74 U/L (ref 37–153)
ALT SERPL-CCNC: 12 U/L (ref 6–29)
ANION GAP SERPL CALCULATED.4IONS-SCNC: 11 MMOL/L (CALC) (ref 7–17)
AST SERPL-CCNC: 17 U/L (ref 10–35)
BILIRUB SERPL-MCNC: 0.4 MG/DL (ref 0.2–1.2)
BUN SERPL-MCNC: 21 MG/DL (ref 7–25)
CALCIUM SERPL-MCNC: 9.7 MG/DL (ref 8.6–10.4)
CHLORIDE SERPL-SCNC: 101 MMOL/L (ref 98–110)
CO2 SERPL-SCNC: 25 MMOL/L (ref 20–32)
CREAT SERPL-MCNC: 0.86 MG/DL (ref 0.5–1.05)
EGFRCR SERPLBLD CKD-EPI 2021: 74 ML/MIN/1.73M2
ERYTHROCYTE [DISTWIDTH] IN BLOOD BY AUTOMATED COUNT: 15 % (ref 11–15)
GLUCOSE SERPL-MCNC: 111 MG/DL (ref 65–139)
HCT VFR BLD AUTO: 42.6 % (ref 35–45)
HGB BLD-MCNC: 14.2 G/DL (ref 11.7–15.5)
MCH RBC QN AUTO: 32.2 PG (ref 27–33)
MCHC RBC AUTO-ENTMCNC: 33.3 G/DL (ref 32–36)
MCV RBC AUTO: 96.6 FL (ref 80–100)
PLATELET # BLD AUTO: 370 THOUSAND/UL (ref 140–400)
PMV BLD REES-ECKER: 10 FL (ref 7.5–12.5)
POTASSIUM SERPL-SCNC: 4.2 MMOL/L (ref 3.5–5.3)
PROT SERPL-MCNC: 7.2 G/DL (ref 6.1–8.1)
RBC # BLD AUTO: 4.41 MILLION/UL (ref 3.8–5.1)
SODIUM SERPL-SCNC: 137 MMOL/L (ref 135–146)
WBC # BLD AUTO: 6.8 THOUSAND/UL (ref 3.8–10.8)

## 2025-04-09 NOTE — PROGRESS NOTES
Counseling:  The patient was counseled regarding diagnostic results, instructions for management, risk factor reductions, prognosis, patient and family education, impressions, risks and benefits of treatment options and importance of compliance with treatment.      Chief Complaint:   The patient presents today for 3-month followup of heart failure, CAD, HTN and a-fib.      History Of Present Illness:    Maria D Bourgeois is a 69 y.o. female patient who presents today for 3-month followup of heart failure, CAD, HTN and a-fib. Her PMH is significant for mild non-obstructive CAD, emphysema, HTN, anxiety, atrial fibrillation, combined systolic and diastolic heart failure and nicotine dependence.     Last Recorded Vitals:  There were no vitals filed for this visit.    Past Surgical History:  She has a past surgical history that includes Other surgical history; Other surgical history;  section, classic; and Cardiac catheterization (N/A, 2024).      Social History:  She reports that she has been smoking cigarettes. She started smoking about 15 months ago. She has a 1.3 pack-year smoking history. She has never used smokeless tobacco. She reports that she does not drink alcohol and does not use drugs.    Family History:  Family History   Problem Relation Name Age of Onset   • Heart disease Mother          Allergies:  Patient has no known allergies.    Outpatient Medications:  Current Outpatient Medications   Medication Instructions   • apixaban (ELIQUIS) 5 mg, oral, 2 times daily   • carbamide peroxide (Debrox) 6.5 % otic solution 5 drops, Left Ear, 2 times daily   • carvedilol (COREG) 3.125 mg, oral, 2 times daily   • empagliflozin (JARDIANCE) 10 mg, oral, Daily   • furosemide (LASIX) 20 mg, oral, Daily   • sacubitriL-valsartan (Entresto) 24-26 mg tablet 0.5 tablets, oral, 2 times daily     Review of Systems   All other systems reviewed and are negative.     Physical Exam:  Constitutional:       Appearance: Healthy  appearance. Not in distress.   Neck:      Vascular: No JVR. JVD normal.   Pulmonary:      Effort: Pulmonary effort is normal.      Breath sounds: Normal breath sounds. No wheezing. No rhonchi. No rales.   Chest:      Chest wall: Not tender to palpatation.   Cardiovascular:      PMI at left midclavicular line. Normal rate. Regular rhythm. Normal S1. Normal S2.       Murmurs: There is no murmur.      No gallop.  No click. No rub.   Pulses:     Intact distal pulses.   Edema:     Peripheral edema absent.   Abdominal:      General: Bowel sounds are normal.      Palpations: Abdomen is soft.      Tenderness: There is no abdominal tenderness.   Musculoskeletal: Normal range of motion.         General: No tenderness. Skin:     General: Skin is warm and dry.   Neurological:      General: No focal deficit present.      Mental Status: Alert and oriented to person, place and time.        Last Labs:  CBC -  Lab Results   Component Value Date    WBC 6.8 03/11/2025    HGB 14.2 03/11/2025    HCT 42.6 03/11/2025    MCV 96.6 03/11/2025     03/11/2025       CMP -  Lab Results   Component Value Date    CALCIUM 9.7 03/11/2025    PHOS 2.6 12/06/2024    PROT 7.2 03/11/2025    ALBUMIN 4.1 03/11/2025    AST 17 03/11/2025    ALT 12 03/11/2025    ALKPHOS 74 03/11/2025    BILITOT 0.4 03/11/2025       LIPID PANEL -   Lab Results   Component Value Date    CHOL 117 12/03/2024    TRIG 57 12/03/2024    HDL 44.7 12/03/2024    CHHDL 2.6 12/03/2024    VLDL 11 12/03/2024    NHDL 72 12/03/2024       RENAL FUNCTION PANEL -   Lab Results   Component Value Date    GLUCOSE 111 03/11/2025     03/11/2025    K 4.2 03/11/2025     03/11/2025    CO2 25 03/11/2025    ANIONGAP 11 03/11/2025    BUN 21 03/11/2025    CREATININE 0.86 03/11/2025    CALCIUM 9.7 03/11/2025    PHOS 2.6 12/06/2024    ALBUMIN 4.1 03/11/2025        Lab Results   Component Value Date    BNP 2,654 (H) 12/03/2024    HGBA1C 6.3 (H) 12/03/2024       Last Cardiology  Tests:  12/06/2024 - CXR   1. Right lung is clear today. Decrease in previous left basilar pneumonia.  2. Cardiomegaly. No indication of ongoing CHF based on this exam.  3. Underlying emphysema with upper lobe predominance.    12/05/2024 - Cardiac Catheterization (LH)  1. Mild non-obstructive coronary artery disease.  2. Normal right heart pressures.  3. Left Ventricular end-diastolic pressure = 9.  4. Normal LV filling pressures.    12/03/2024 - CTA Chest for PE  1. Developing left lower lobe consolidation with patchy areas of airspace disease in the right middle lobe and right lower lobe indicating multilobar pneumonia in the appropriate clinical setting.  2. Cardiomegaly with mild pulmonary edema and trace bilateral pleural effusions suggesting CHF.  3. No definite large central pulmonary embolus identified.    12/03/2024 - TTE  1. The left ventricular systolic function is moderately decreased, with a Guerrero's biplane calculated ejection fraction of 40%.  2. There is global hypokinesis of the left ventricle with minor regional variations.  3. Left ventricular diastolic filling was indeterminate.  4. There is moderately reduced right ventricular systolic function.  5. Aortic valve stenosis is not present.  6. Mild aortic valve regurgitation.  7. Lipomatous hypertrophy of the atrial septum.  8. There is moderately increased septal and severely increased posterior left ventricular wall thickness.    Lab review: I have personally reviewed the laboratory result(s).    Assessment/Plan   1) Combined Systolic and Diastolic Heart Failure - Mild CAD by Cath  On Entresto 24-26 mg one half tablet BID, furosemide 20 mg daily, carvedilol 3.125 mg twice daily  Hospital admission 12/03/2024 to 12/06/2024   CTA chest with developing left lower lobe consolidation with patchy areas of airspace disease in right middle lobe and right lower lobe indicating multilobar pneumonia, cardiomegaly with mild pulmonary edema, trace bilateral  pleural effusions suggesting CHF.  TTE with LVEF 40%, moderately reduced RV systolic function, mild aortic regurgitation, lipomatous hypertrophy of atrial septum, moderately increased septal and severely increased posterior LV wall thickness.  LHC with mild non-obstructive CAD, normal right heart pressures   CXR with clear right lung bases, decrease in previous left basilar pneumonia, cardiomegaly, no indication of ongoing CHF, underlying emphysema with upper lobe predominance.    2) HTN  Stable  On Entresto 24-26 mg one half tablet BID, furosemide 20 mg daily, carvedilol 3.125 mg twice daily     3) Atrial Fibrillation   On Eliquis 5 mg BID, carvedilol 3.125 mg BID  Hospital admission 12/03/2024 to 12/06/2024 with heart failure  Patient was found to be in atrial fibrillation   Patient spontaneously converted to sinus rhythm during hospitalization.     4) Emphysema   CTA chest 12/03/2024 with developing left lower lobe consolidation with patchy areas of airspace disease in right middle lobe and right lower lobe indicating multilobar pneumonia, cardiomegaly with mild pulmonary edema, trace bilateral pleural effusions suggesting CHF.  CXR 12/06/2025 with underlying emphysema with upper lobe predominance      Scribe Attestation  By signing my name below, I, Tricia Patel   attest that this documentation has been prepared under the direction and in the presence of Adeel Cadena MD.

## 2025-04-10 ENCOUNTER — APPOINTMENT (OUTPATIENT)
Dept: CARDIOLOGY | Facility: HOSPITAL | Age: 70
End: 2025-04-10
Payer: COMMERCIAL

## 2025-04-28 NOTE — PROGRESS NOTES
Counseling:  The patient was counseled regarding diagnostic results, instructions for management, risk factor reductions, prognosis, patient and family education, impressions, risks and benefits of treatment options and importance of compliance with treatment.      Chief Complaint:   The patient presents today for 3-month followup of heart failure, CAD, HTN and a-fib.      History Of Present Illness:    Maria D Bourgeois is a 70 y.o. female patient who presents today for 3-month followup of heart failure, CAD, HTN and a-fib. Her PMH is significant for mild non-obstructive CAD, emphysema, HTN, anxiety, atrial fibrillation, combined systolic and diastolic heart failure and nicotine dependence. Over the past 3 months, the patient states that she has done well from a cardiac standpoint. She denies any CP, chest discomfort, SOB or palpitations. Her only complaint is that of easy fatigability. BP has been stable. EKG today shows that the patient is maintaining NSR. The patient is compliant with her prescribed medications.      Last Recorded Vitals:  Vitals:    25 1432   BP: 138/82   Pulse: 81   Weight: 52.6 kg (116 lb)   Height: 1.524 m (5')       Past Surgical History:  She has a past surgical history that includes Other surgical history; Other surgical history;  section, classic; and Cardiac catheterization (N/A, 2024).      Social History:  She reports that she has been smoking cigarettes. She started smoking about 15 months ago. She has a 1.3 pack-year smoking history. She has never used smokeless tobacco. She reports that she does not drink alcohol and does not use drugs.    Family History:  Family History   Problem Relation Name Age of Onset    Heart disease Mother          Allergies:  Patient has no known allergies.    Outpatient Medications:  Current Outpatient Medications   Medication Instructions    apixaban (ELIQUIS) 5 mg, oral, 2 times daily    carbamide peroxide (Debrox) 6.5 % otic solution 5  drops, Left Ear, 2 times daily    carvedilol (COREG) 3.125 mg, oral, 2 times daily    empagliflozin (JARDIANCE) 10 mg, oral, Daily    furosemide (LASIX) 20 mg, oral, Daily    sacubitriL-valsartan (Entresto) 24-26 mg tablet 0.5 tablets, oral, 2 times daily     Review of Systems   Constitutional: Positive for malaise/fatigue.   All other systems reviewed and are negative.     Physical Exam:  Constitutional:       Appearance: Healthy appearance. Not in distress.   Neck:      Vascular: No JVR. JVD normal.   Pulmonary:      Effort: Pulmonary effort is normal.      Breath sounds: Normal breath sounds. No wheezing. No rhonchi. No rales.   Chest:      Chest wall: Not tender to palpatation.   Cardiovascular:      PMI at left midclavicular line. Normal rate. Regular rhythm. Normal S1. Normal S2.       Murmurs: There is no murmur.      No gallop.  No click. No rub.   Pulses:     Intact distal pulses.   Edema:     Peripheral edema absent.   Abdominal:      General: Bowel sounds are normal.      Palpations: Abdomen is soft.      Tenderness: There is no abdominal tenderness.   Musculoskeletal: Normal range of motion.         General: No tenderness. Skin:     General: Skin is warm and dry.   Neurological:      General: No focal deficit present.      Mental Status: Alert and oriented to person, place and time.          Last Labs:  CBC -  Lab Results   Component Value Date    WBC 6.8 03/11/2025    HGB 14.2 03/11/2025    HCT 42.6 03/11/2025    MCV 96.6 03/11/2025     03/11/2025       CMP -  Lab Results   Component Value Date    CALCIUM 9.7 03/11/2025    PHOS 2.6 12/06/2024    PROT 7.2 03/11/2025    ALBUMIN 4.1 03/11/2025    AST 17 03/11/2025    ALT 12 03/11/2025    ALKPHOS 74 03/11/2025    BILITOT 0.4 03/11/2025       LIPID PANEL -   Lab Results   Component Value Date    CHOL 117 12/03/2024    TRIG 57 12/03/2024    HDL 44.7 12/03/2024    CHHDL 2.6 12/03/2024    VLDL 11 12/03/2024    NHDL 72 12/03/2024       RENAL FUNCTION  PANEL -   Lab Results   Component Value Date    GLUCOSE 111 03/11/2025     03/11/2025    K 4.2 03/11/2025     03/11/2025    CO2 25 03/11/2025    ANIONGAP 11 03/11/2025    BUN 21 03/11/2025    CREATININE 0.86 03/11/2025    CALCIUM 9.7 03/11/2025    PHOS 2.6 12/06/2024    ALBUMIN 4.1 03/11/2025        Lab Results   Component Value Date    BNP 2,654 (H) 12/03/2024    HGBA1C 6.3 (H) 12/03/2024       Last Cardiology Tests:  12/06/2024 - CXR   1. Right lung is clear today. Decrease in previous left basilar pneumonia.  2. Cardiomegaly. No indication of ongoing CHF based on this exam.  3. Underlying emphysema with upper lobe predominance.    12/05/2024 - Cardiac Catheterization (LH)  1. Mild non-obstructive coronary artery disease.  2. Normal right heart pressures.  3. Left Ventricular end-diastolic pressure = 9.  4. Normal LV filling pressures.    12/03/2024 - CTA Chest for PE  1. Developing left lower lobe consolidation with patchy areas of airspace disease in the right middle lobe and right lower lobe indicating multilobar pneumonia in the appropriate clinical setting.  2. Cardiomegaly with mild pulmonary edema and trace bilateral pleural effusions suggesting CHF.  3. No definite large central pulmonary embolus identified.    12/03/2024 - TTE  1. The left ventricular systolic function is moderately decreased, with a Guerrero's biplane calculated ejection fraction of 40%.  2. There is global hypokinesis of the left ventricle with minor regional variations.  3. Left ventricular diastolic filling was indeterminate.  4. There is moderately reduced right ventricular systolic function.  5. Aortic valve stenosis is not present.  6. Mild aortic valve regurgitation.  7. Lipomatous hypertrophy of the atrial septum.  8. There is moderately increased septal and severely increased posterior left ventricular wall thickness.    Lab review: I have personally reviewed the laboratory result(s).    Assessment/Plan   1) Combined  Systolic and Diastolic Heart Failure - Mild CAD by Cath  On Entresto 24-26 mg one half tablet BID, furosemide 20 mg daily, carvedilol 3.125 mg BID  Hospital admission 12/03/2024 to 12/06/2024   CTA chest with developing left lower lobe consolidation with patchy areas of airspace disease in right middle lobe and right lower lobe indicating multilobar pneumonia, cardiomegaly with mild pulmonary edema, trace bilateral pleural effusions suggesting CHF.  TTE with LVEF 40%, moderately reduced RV systolic function, mild aortic regurgitation, lipomatous hypertrophy of atrial septum, moderately increased septal and severely increased posterior LV wall thickness.  LHC with mild non-obstructive CAD, normal right heart pressures   CXR with clear right lung bases, decrease in previous left basilar pneumonia, cardiomegaly, no indication of ongoing CHF, underlying emphysema with upper lobe predominance.  Denies CP, chest discomfort or SOB  Reports easy fatigability   BP stable  Continue current medical Rx  Referral placed to Clinical Pharmacy re:  with HF meds   Check limited echo now that patient is maintaining NSR  F/U after echo      2) HTN  Stable  On Entresto 24-26 mg one half tablet BID, furosemide 20 mg daily, carvedilol 3.125 mg BID  Continue current medical Rx     3) Atrial Fibrillation   On Eliquis 5 mg BID, carvedilol 3.125 mg BID  Hospital admission 12/03/2024 to 12/06/2024 with heart failure  Patient was found to be in atrial fibrillation   Patient spontaneously converted to sinus rhythm during hospitalization.   FH positive for a-fib in father   Maintaining NSR  Continue current medical Rx     4) Emphysema   CTA chest 12/03/2024 with developing left lower lobe consolidation with patchy areas of airspace disease in right middle lobe and right lower lobe indicating multilobar pneumonia, cardiomegaly with mild pulmonary edema, trace bilateral pleural effusions suggesting CHF.  CXR 12/06/2025 with  underlying emphysema with upper lobe predominance      Scribe Attestation  By signing my name below, I, Tricia Patel   attest that this documentation has been prepared under the direction and in the presence of Adeel Cadena MD.

## 2025-04-29 ENCOUNTER — APPOINTMENT (OUTPATIENT)
Dept: CARDIOLOGY | Facility: HOSPITAL | Age: 70
End: 2025-04-29
Payer: COMMERCIAL

## 2025-04-29 VITALS
WEIGHT: 116 LBS | HEIGHT: 60 IN | SYSTOLIC BLOOD PRESSURE: 138 MMHG | DIASTOLIC BLOOD PRESSURE: 82 MMHG | BODY MASS INDEX: 22.78 KG/M2 | HEART RATE: 81 BPM

## 2025-04-29 DIAGNOSIS — I10 PRIMARY HYPERTENSION: Primary | ICD-10-CM

## 2025-04-29 DIAGNOSIS — I48.0 PAROXYSMAL ATRIAL FIBRILLATION (MULTI): ICD-10-CM

## 2025-04-29 DIAGNOSIS — I50.40 COMBINED SYSTOLIC AND DIASTOLIC HEART FAILURE, UNSPECIFIED HF CHRONICITY: ICD-10-CM

## 2025-04-29 LAB
ATRIAL RATE: 81 BPM
P OFFSET: 210 MS
P ONSET: 150 MS
PR INTERVAL: 152 MS
Q ONSET: 226 MS
QRS COUNT: 14 BEATS
QRS DURATION: 82 MS
QT INTERVAL: 408 MS
QTC CALCULATION(BAZETT): 473 MS
QTC FREDERICIA: 450 MS
R AXIS: 94 DEGREES
T AXIS: 68 DEGREES
T OFFSET: 430 MS
VENTRICULAR RATE: 81 BPM

## 2025-04-29 PROCEDURE — 3079F DIAST BP 80-89 MM HG: CPT | Performed by: INTERNAL MEDICINE

## 2025-04-29 PROCEDURE — 1160F RVW MEDS BY RX/DR IN RCRD: CPT | Performed by: INTERNAL MEDICINE

## 2025-04-29 PROCEDURE — 99213 OFFICE O/P EST LOW 20 MIN: CPT | Mod: 25 | Performed by: INTERNAL MEDICINE

## 2025-04-29 PROCEDURE — 1159F MED LIST DOCD IN RCRD: CPT | Performed by: INTERNAL MEDICINE

## 2025-04-29 PROCEDURE — 99213 OFFICE O/P EST LOW 20 MIN: CPT | Performed by: INTERNAL MEDICINE

## 2025-04-29 PROCEDURE — 3008F BODY MASS INDEX DOCD: CPT | Performed by: INTERNAL MEDICINE

## 2025-04-29 PROCEDURE — 3075F SYST BP GE 130 - 139MM HG: CPT | Performed by: INTERNAL MEDICINE

## 2025-04-29 PROCEDURE — 93005 ELECTROCARDIOGRAM TRACING: CPT | Performed by: INTERNAL MEDICINE

## 2025-04-29 NOTE — LETTER
2025     Reena Colbert DO  [address] [facility] [address]  [facility] [address]    Patient: Maria D Bourgeois   YOB: 1955   Date of Visit: 2025       Dear Dr. Reena Colbert DO:    Thank you for referring Maria D Bourgeois to me for evaluation. Below are my notes for this consultation.  If you have questions, please do not hesitate to call me. I look forward to following your patient along with you.       Sincerely,     Adeel Cadena MD      CC: No Recipients  ______________________________________________________________________________________    Counseling:  The patient was counseled regarding diagnostic results, instructions for management, risk factor reductions, prognosis, patient and family education, impressions, risks and benefits of treatment options and importance of compliance with treatment.      Chief Complaint:   The patient presents today for 3-month followup of heart failure, CAD, HTN and a-fib.      History Of Present Illness:    Maria D Bourgeois is a 70 y.o. female patient who presents today for 3-month followup of heart failure, CAD, HTN and a-fib. Her PMH is significant for mild non-obstructive CAD, emphysema, HTN, anxiety, atrial fibrillation, combined systolic and diastolic heart failure and nicotine dependence. Over the past 3 months, the patient states that she has done well from a cardiac standpoint. She denies any CP, chest discomfort, SOB or palpitations. Her only complaint is that of easy fatigability. BP has been stable. EKG today shows that the patient is maintaining NSR. The patient is compliant with her prescribed medications.      Last Recorded Vitals:  Vitals:    25 1432   BP: 138/82   Pulse: 81   Weight: 52.6 kg (116 lb)   Height: 1.524 m (5')       Past Surgical History:  She has a past surgical history that includes Other surgical history; Other surgical history;  section, classic; and Cardiac  catheterization (N/A, 12/5/2024).      Social History:  She reports that she has been smoking cigarettes. She started smoking about 15 months ago. She has a 1.3 pack-year smoking history. She has never used smokeless tobacco. She reports that she does not drink alcohol and does not use drugs.    Family History:  Family History   Problem Relation Name Age of Onset   • Heart disease Mother          Allergies:  Patient has no known allergies.    Outpatient Medications:  Current Outpatient Medications   Medication Instructions   • apixaban (ELIQUIS) 5 mg, oral, 2 times daily   • carbamide peroxide (Debrox) 6.5 % otic solution 5 drops, Left Ear, 2 times daily   • carvedilol (COREG) 3.125 mg, oral, 2 times daily   • empagliflozin (JARDIANCE) 10 mg, oral, Daily   • furosemide (LASIX) 20 mg, oral, Daily   • sacubitriL-valsartan (Entresto) 24-26 mg tablet 0.5 tablets, oral, 2 times daily     Review of Systems   Constitutional: Positive for malaise/fatigue.   All other systems reviewed and are negative.     Physical Exam:  Constitutional:       Appearance: Healthy appearance. Not in distress.   Neck:      Vascular: No JVR. JVD normal.   Pulmonary:      Effort: Pulmonary effort is normal.      Breath sounds: Normal breath sounds. No wheezing. No rhonchi. No rales.   Chest:      Chest wall: Not tender to palpatation.   Cardiovascular:      PMI at left midclavicular line. Normal rate. Regular rhythm. Normal S1. Normal S2.       Murmurs: There is no murmur.      No gallop.  No click. No rub.   Pulses:     Intact distal pulses.   Edema:     Peripheral edema absent.   Abdominal:      General: Bowel sounds are normal.      Palpations: Abdomen is soft.      Tenderness: There is no abdominal tenderness.   Musculoskeletal: Normal range of motion.         General: No tenderness. Skin:     General: Skin is warm and dry.   Neurological:      General: No focal deficit present.      Mental Status: Alert and oriented to person, place and  time.          Last Labs:  CBC -  Lab Results   Component Value Date    WBC 6.8 03/11/2025    HGB 14.2 03/11/2025    HCT 42.6 03/11/2025    MCV 96.6 03/11/2025     03/11/2025       CMP -  Lab Results   Component Value Date    CALCIUM 9.7 03/11/2025    PHOS 2.6 12/06/2024    PROT 7.2 03/11/2025    ALBUMIN 4.1 03/11/2025    AST 17 03/11/2025    ALT 12 03/11/2025    ALKPHOS 74 03/11/2025    BILITOT 0.4 03/11/2025       LIPID PANEL -   Lab Results   Component Value Date    CHOL 117 12/03/2024    TRIG 57 12/03/2024    HDL 44.7 12/03/2024    CHHDL 2.6 12/03/2024    VLDL 11 12/03/2024    NHDL 72 12/03/2024       RENAL FUNCTION PANEL -   Lab Results   Component Value Date    GLUCOSE 111 03/11/2025     03/11/2025    K 4.2 03/11/2025     03/11/2025    CO2 25 03/11/2025    ANIONGAP 11 03/11/2025    BUN 21 03/11/2025    CREATININE 0.86 03/11/2025    CALCIUM 9.7 03/11/2025    PHOS 2.6 12/06/2024    ALBUMIN 4.1 03/11/2025        Lab Results   Component Value Date    BNP 2,654 (H) 12/03/2024    HGBA1C 6.3 (H) 12/03/2024       Last Cardiology Tests:  12/06/2024 - CXR   1. Right lung is clear today. Decrease in previous left basilar pneumonia.  2. Cardiomegaly. No indication of ongoing CHF based on this exam.  3. Underlying emphysema with upper lobe predominance.    12/05/2024 - Cardiac Catheterization (LH)  1. Mild non-obstructive coronary artery disease.  2. Normal right heart pressures.  3. Left Ventricular end-diastolic pressure = 9.  4. Normal LV filling pressures.    12/03/2024 - CTA Chest for PE  1. Developing left lower lobe consolidation with patchy areas of airspace disease in the right middle lobe and right lower lobe indicating multilobar pneumonia in the appropriate clinical setting.  2. Cardiomegaly with mild pulmonary edema and trace bilateral pleural effusions suggesting CHF.  3. No definite large central pulmonary embolus identified.    12/03/2024 - TTE  1. The left ventricular systolic function  is moderately decreased, with a Guerrero's biplane calculated ejection fraction of 40%.  2. There is global hypokinesis of the left ventricle with minor regional variations.  3. Left ventricular diastolic filling was indeterminate.  4. There is moderately reduced right ventricular systolic function.  5. Aortic valve stenosis is not present.  6. Mild aortic valve regurgitation.  7. Lipomatous hypertrophy of the atrial septum.  8. There is moderately increased septal and severely increased posterior left ventricular wall thickness.    Lab review: I have personally reviewed the laboratory result(s).    Assessment/Plan  1) Combined Systolic and Diastolic Heart Failure - Mild CAD by Cath  On Entresto 24-26 mg one half tablet BID, furosemide 20 mg daily, carvedilol 3.125 mg BID  Hospital admission 12/03/2024 to 12/06/2024   CTA chest with developing left lower lobe consolidation with patchy areas of airspace disease in right middle lobe and right lower lobe indicating multilobar pneumonia, cardiomegaly with mild pulmonary edema, trace bilateral pleural effusions suggesting CHF.  TTE with LVEF 40%, moderately reduced RV systolic function, mild aortic regurgitation, lipomatous hypertrophy of atrial septum, moderately increased septal and severely increased posterior LV wall thickness.  LHC with mild non-obstructive CAD, normal right heart pressures   CXR with clear right lung bases, decrease in previous left basilar pneumonia, cardiomegaly, no indication of ongoing CHF, underlying emphysema with upper lobe predominance.  Denies CP, chest discomfort or SOB  Reports easy fatigability   BP stable  Continue current medical Rx  Referral placed to Clinical Pharmacy re:  with HF meds   Check limited echo now that patient is maintaining NSR  F/U after echo      2) HTN  Stable  On Entresto 24-26 mg one half tablet BID, furosemide 20 mg daily, carvedilol 3.125 mg BID  Continue current medical Rx     3) Atrial  Fibrillation   On Eliquis 5 mg BID, carvedilol 3.125 mg BID  Hospital admission 12/03/2024 to 12/06/2024 with heart failure  Patient was found to be in atrial fibrillation   Patient spontaneously converted to sinus rhythm during hospitalization.   FH positive for a-fib in father   Maintaining NSR  Continue current medical Rx     4) Emphysema   CTA chest 12/03/2024 with developing left lower lobe consolidation with patchy areas of airspace disease in right middle lobe and right lower lobe indicating multilobar pneumonia, cardiomegaly with mild pulmonary edema, trace bilateral pleural effusions suggesting CHF.  CXR 12/06/2025 with underlying emphysema with upper lobe predominance      Scribe Attestation  By signing my name below, I, Tricia Patel   attest that this documentation has been prepared under the direction and in the presence of Adeel Cadena MD.

## 2025-04-29 NOTE — PATIENT INSTRUCTIONS
Continue all current medications as prescribed.  Dr. Cadena has placed a referral to [facility] to request financial assistance for your medications.   Dr. Cadena has ordered an echocardiogram (ultrasound of the heart) to followup on your heart function and structure.   Followup with Dr. Cadena after the above echocardiogram.    If you have any questions or cardiac concerns, please call our office at [phone].

## 2025-05-27 ENCOUNTER — TELEPHONE (OUTPATIENT)
Dept: CARDIOLOGY | Facility: HOSPITAL | Age: 70
End: 2025-05-27
Payer: COMMERCIAL

## 2025-05-27 NOTE — TELEPHONE ENCOUNTER
Called patient to schedule ECHO but phone number on file is disconnected alled spouse also not able to LVM

## 2025-05-30 ENCOUNTER — TELEPHONE (OUTPATIENT)
Dept: CARDIOLOGY | Facility: HOSPITAL | Age: 70
End: 2025-05-30
Payer: COMMERCIAL

## 2025-05-30 NOTE — TELEPHONE ENCOUNTER
Attempted to reach patient to schedule ECHO before their appointment today.     Called all phone numbers and alternate contacts and they were all disconnected.    Thank you!  Nash BUSTOS

## 2025-07-03 ENCOUNTER — HOSPITAL ENCOUNTER (OUTPATIENT)
Dept: CARDIOLOGY | Facility: HOSPITAL | Age: 70
Discharge: HOME | End: 2025-07-03
Payer: COMMERCIAL

## 2025-07-03 LAB
EJECTION FRACTION APICAL 4 CHAMBER: 66.3
EJECTION FRACTION: 58 %
LV EJECTION FRACTION BIPLANE: 62 %
MITRAL VALVE E/A RATIO: 0.54

## 2025-07-03 PROCEDURE — 93308 TTE F-UP OR LMTD: CPT

## 2025-07-03 PROCEDURE — 93308 TTE F-UP OR LMTD: CPT | Performed by: INTERNAL MEDICINE

## 2025-07-21 ENCOUNTER — APPOINTMENT (OUTPATIENT)
Dept: CARDIOLOGY | Facility: HOSPITAL | Age: 70
End: 2025-07-21
Payer: COMMERCIAL

## 2025-08-18 RX ORDER — BUPRENORPHINE 5 UG/H
PATCH TRANSDERMAL
COMMUNITY
Start: 2016-01-14 | End: 2025-08-19 | Stop reason: ALTCHOICE

## 2025-08-19 ENCOUNTER — TELEPHONE (OUTPATIENT)
Dept: CARDIOLOGY | Facility: HOSPITAL | Age: 70
End: 2025-08-19

## 2025-08-19 ENCOUNTER — OFFICE VISIT (OUTPATIENT)
Dept: CARDIOLOGY | Facility: HOSPITAL | Age: 70
End: 2025-08-19
Payer: COMMERCIAL

## 2025-08-19 VITALS
SYSTOLIC BLOOD PRESSURE: 140 MMHG | DIASTOLIC BLOOD PRESSURE: 80 MMHG | WEIGHT: 112.8 LBS | OXYGEN SATURATION: 97 % | HEART RATE: 88 BPM | BODY MASS INDEX: 22.74 KG/M2 | HEIGHT: 59 IN

## 2025-08-19 DIAGNOSIS — I50.40 COMBINED SYSTOLIC AND DIASTOLIC HEART FAILURE, UNSPECIFIED HF CHRONICITY: Primary | ICD-10-CM

## 2025-08-19 DIAGNOSIS — I48.0 PAROXYSMAL ATRIAL FIBRILLATION (MULTI): ICD-10-CM

## 2025-08-19 LAB
ATRIAL RATE: 88 BPM
P OFFSET: 213 MS
P ONSET: 150 MS
PR INTERVAL: 154 MS
Q ONSET: 227 MS
QRS COUNT: 14 BEATS
QRS DURATION: 82 MS
QT INTERVAL: 430 MS
QTC CALCULATION(BAZETT): 520 MS
QTC FREDERICIA: 488 MS
R AXIS: 34 DEGREES
T AXIS: 131 DEGREES
T OFFSET: 442 MS
VENTRICULAR RATE: 88 BPM

## 2025-08-19 PROCEDURE — 3079F DIAST BP 80-89 MM HG: CPT | Performed by: INTERNAL MEDICINE

## 2025-08-19 PROCEDURE — 1160F RVW MEDS BY RX/DR IN RCRD: CPT | Performed by: INTERNAL MEDICINE

## 2025-08-19 PROCEDURE — 3008F BODY MASS INDEX DOCD: CPT | Performed by: INTERNAL MEDICINE

## 2025-08-19 PROCEDURE — 3077F SYST BP >= 140 MM HG: CPT | Performed by: INTERNAL MEDICINE

## 2025-08-19 PROCEDURE — 99213 OFFICE O/P EST LOW 20 MIN: CPT | Mod: 25

## 2025-08-19 PROCEDURE — 1159F MED LIST DOCD IN RCRD: CPT | Performed by: INTERNAL MEDICINE

## 2025-08-19 PROCEDURE — 93010 ELECTROCARDIOGRAM REPORT: CPT | Performed by: INTERNAL MEDICINE

## 2025-08-19 PROCEDURE — 99213 OFFICE O/P EST LOW 20 MIN: CPT | Performed by: INTERNAL MEDICINE

## 2025-08-19 PROCEDURE — 93005 ELECTROCARDIOGRAM TRACING: CPT | Performed by: INTERNAL MEDICINE

## 2025-08-29 DIAGNOSIS — I50.23 ACUTE ON CHRONIC SYSTOLIC (CONGESTIVE) HEART FAILURE: ICD-10-CM

## 2025-08-30 RX ORDER — SACUBITRIL AND VALSARTAN 24; 26 MG/1; MG/1
0.5 TABLET ORAL 2 TIMES DAILY
Qty: 90 TABLET | Refills: 0 | Status: SHIPPED | OUTPATIENT
Start: 2025-08-30

## 2025-08-30 RX ORDER — CARVEDILOL 3.12 MG/1
3.12 TABLET ORAL 2 TIMES DAILY
Qty: 180 TABLET | Refills: 0 | Status: SHIPPED | OUTPATIENT
Start: 2025-08-30 | End: 2026-08-30

## 2025-08-30 RX ORDER — FUROSEMIDE 20 MG/1
20 TABLET ORAL DAILY
Qty: 90 TABLET | Refills: 0 | Status: SHIPPED | OUTPATIENT
Start: 2025-08-30

## 2025-09-01 DIAGNOSIS — I50.23 ACUTE ON CHRONIC SYSTOLIC (CONGESTIVE) HEART FAILURE: ICD-10-CM

## 2025-09-02 RX ORDER — FUROSEMIDE 20 MG/1
20 TABLET ORAL DAILY
Qty: 90 TABLET | Refills: 3 | Status: SHIPPED | OUTPATIENT
Start: 2025-09-02

## (undated) DEVICE — ACCESS KIT, S-MAK MINI, 4FR 10CM 0.018IN 40CM, NT/PT, ECHO ENHANCE NEEDLE

## (undated) DEVICE — TR BAND, RADIAL COMPRESSION, STANDARD, 24CM

## (undated) DEVICE — GUIDEWIRE, INQUIRE, J TIP, .035 X 210CM, FIXED CORE, DIAGNOSTIC

## (undated) DEVICE — CATHETER, WEDGE PRESSURE, BALLOON, DOUBLE LUMEN, 5 FR, 110 CM

## (undated) DEVICE — CONTROL WIRE, .018 X 300

## (undated) DEVICE — SHEATH, GLIDESHEATH, SLENDER, 5FR 10CM

## (undated) DEVICE — CATHETER, OPTITORQUE, 5FR, JACKY, 3.5/ 2H/110CM, CURVED